# Patient Record
Sex: FEMALE | Race: OTHER | Employment: UNEMPLOYED | URBAN - METROPOLITAN AREA
[De-identification: names, ages, dates, MRNs, and addresses within clinical notes are randomized per-mention and may not be internally consistent; named-entity substitution may affect disease eponyms.]

---

## 2018-11-17 ENCOUNTER — HOSPITAL ENCOUNTER (EMERGENCY)
Age: 28
Discharge: HOME | End: 2018-11-17
Attending: EMERGENCY MEDICINE
Payer: SELF-PAY

## 2018-11-17 VITALS
SYSTOLIC BLOOD PRESSURE: 119 MMHG | TEMPERATURE: 98.2 F | RESPIRATION RATE: 18 BRPM | HEART RATE: 95 BPM | DIASTOLIC BLOOD PRESSURE: 82 MMHG | OXYGEN SATURATION: 100 % | WEIGHT: 145 LBS

## 2018-11-17 DIAGNOSIS — J06.9 VIRAL URI WITH COUGH: Primary | ICD-10-CM

## 2018-11-17 DIAGNOSIS — S46.812A STRAIN OF LEFT TRAPEZIUS MUSCLE, INITIAL ENCOUNTER: ICD-10-CM

## 2018-11-17 PROCEDURE — 74011250637 HC RX REV CODE- 250/637: Performed by: EMERGENCY MEDICINE

## 2018-11-17 PROCEDURE — 99282 EMERGENCY DEPT VISIT SF MDM: CPT

## 2018-11-17 RX ORDER — NAPROXEN 250 MG/1
500 TABLET ORAL
Status: COMPLETED | OUTPATIENT
Start: 2018-11-17 | End: 2018-11-17

## 2018-11-17 RX ORDER — METHOCARBAMOL 500 MG/1
500 TABLET, FILM COATED ORAL
Qty: 20 TAB | Refills: 0 | Status: SHIPPED | OUTPATIENT
Start: 2018-11-17 | End: 2019-06-13

## 2018-11-17 RX ORDER — NAPROXEN 500 MG/1
500 TABLET ORAL 2 TIMES DAILY WITH MEALS
Qty: 20 TAB | Refills: 0 | Status: SHIPPED | OUTPATIENT
Start: 2018-11-17 | End: 2019-06-13

## 2018-11-17 RX ORDER — METHOCARBAMOL 500 MG/1
1000 TABLET, FILM COATED ORAL ONCE
Status: COMPLETED | OUTPATIENT
Start: 2018-11-17 | End: 2018-11-17

## 2018-11-17 RX ADMIN — NAPROXEN 500 MG: 250 TABLET ORAL at 12:03

## 2018-11-17 RX ADMIN — METHOCARBAMOL TABLETS 1000 MG: 500 TABLET, COATED ORAL at 12:03

## 2018-11-17 NOTE — ED PROVIDER NOTES
The history is limited by a language barrier. A  was used. 33 y.o. female presents with 4 days cough, congestion, 2 days and has developed tightness and pain in her left trapezius pain radiates down to her tricep area. No trauma to the area. No fever, chills, nausea, vomiting, SOB, but also has noted some sharp chest pins with coughing, mildly. Predominantly in the left trapezius muscle, however. LMP 2 week prior. No past medical history on file. No past surgical history on file. No family history on file. Social History Socioeconomic History  Marital status: SINGLE Spouse name: Not on file  Number of children: Not on file  Years of education: Not on file  Highest education level: Not on file Social Needs  Financial resource strain: Not on file  Food insecurity - worry: Not on file  Food insecurity - inability: Not on file  Transportation needs - medical: Not on file  Transportation needs - non-medical: Not on file Occupational History  Not on file Tobacco Use  Smoking status: Not on file Substance and Sexual Activity  Alcohol use: Not on file  Drug use: Not on file  Sexual activity: Not on file Other Topics Concern  Not on file Social History Narrative  Not on file ALLERGIES: Patient has no known allergies. Review of Systems Constitutional: Positive for activity change. Negative for appetite change, chills and fever. HENT: Positive for congestion, rhinorrhea, sinus pressure and sneezing. Negative for sore throat. Eyes: Negative for photophobia and visual disturbance. Respiratory: Negative for cough and shortness of breath. Cardiovascular: Negative for chest pain. Gastrointestinal: Negative for abdominal pain, blood in stool, constipation, diarrhea, nausea and vomiting.   
Genitourinary: Negative for difficulty urinating, dysuria, flank pain, frequency, hematuria, menstrual problem, urgency, vaginal bleeding and vaginal discharge. Musculoskeletal: Positive for back pain (left trapezius). Negative for arthralgias, gait problem, joint swelling, myalgias and neck pain. Skin: Negative for rash and wound. Neurological: Negative for syncope, weakness, numbness and headaches. Psychiatric/Behavioral: Negative for self-injury and suicidal ideas. All other systems reviewed and are negative. Vitals:  
 11/17/18 1122 BP: 119/82 Pulse: 95 Resp: 18 Temp: 98.2 °F (36.8 °C) SpO2: 100% Weight: 65.8 kg (145 lb) Physical Exam  
Constitutional: She is oriented to person, place, and time. She appears well-developed and well-nourished. No distress. HENT:  
Head: Normocephalic and atraumatic. Right Ear: External ear normal.  
Left Ear: External ear normal.  
Erythematous posterior oropharynx without exudate or asymmetric swelling. rhinorrhea Eyes: Conjunctivae and EOM are normal. Pupils are equal, round, and reactive to light. Neck: Normal range of motion. Neck supple. Cardiovascular: Normal rate, regular rhythm, normal heart sounds and intact distal pulses. Pulmonary/Chest: Effort normal and breath sounds normal. She exhibits tenderness (mild in left anterior chest). Abdominal: Soft. She exhibits no distension. There is no tenderness. Musculoskeletal: She exhibits tenderness. She exhibits no edema or deformity. Left shoulder: She exhibits tenderness and spasm. She exhibits normal range of motion, no bony tenderness, no swelling, no crepitus, no deformity and normal pulse. Lymphadenopathy:  
  She has cervical adenopathy. Neurological: She is alert and oriented to person, place, and time. No cranial nerve deficit or sensory deficit. Coordination normal.  
Skin: Skin is warm and dry. No rash noted. She is not diaphoretic. Nursing note and vitals reviewed.  
  
 
PRISCILLA 
 33 y.o. female presents with URI sx as well as evidence of muscle spasm and strain in left trapezius. Nonproductive cough, afebrile with lungs CTAb. Low suspicion for PNA. Likely muscle strain from coughing. Given NSAIDS and robaxin in the ED and rx'd the same. rec'd hydration and rest with PCP f/u as needed. Return precautions were given for worsening or concerns Procedures

## 2018-11-17 NOTE — ED TRIAGE NOTES
Pt states she started having shocking pain in her left hand radiating up her arm, pt states she has had a cold, cough and congestion for the past 2 days.

## 2019-01-14 ENCOUNTER — HOSPITAL ENCOUNTER (EMERGENCY)
Age: 29
Discharge: HOME | End: 2019-01-14
Attending: EMERGENCY MEDICINE | Admitting: EMERGENCY MEDICINE
Payer: SELF-PAY

## 2019-01-14 VITALS
WEIGHT: 145 LBS | OXYGEN SATURATION: 100 % | HEART RATE: 94 BPM | HEIGHT: 65 IN | BODY MASS INDEX: 24.16 KG/M2 | DIASTOLIC BLOOD PRESSURE: 90 MMHG | SYSTOLIC BLOOD PRESSURE: 133 MMHG | RESPIRATION RATE: 18 BRPM | TEMPERATURE: 98.8 F

## 2019-01-14 DIAGNOSIS — N93.8 DUB (DYSFUNCTIONAL UTERINE BLEEDING): Primary | ICD-10-CM

## 2019-01-14 LAB
APPEARANCE UR: CLEAR
BACTERIA URNS QL MICRO: NEGATIVE /HPF
BILIRUB UR QL: NEGATIVE
CLUE CELLS VAG QL WET PREP: NORMAL
COLOR UR: ABNORMAL
EPITH CASTS URNS QL MICRO: ABNORMAL /LPF
GLUCOSE UR STRIP.AUTO-MCNC: NEGATIVE MG/DL
HCG UR QL: NEGATIVE
HGB UR QL STRIP: ABNORMAL
HYALINE CASTS URNS QL MICRO: ABNORMAL /LPF (ref 0–5)
KETONES UR QL STRIP.AUTO: NEGATIVE MG/DL
LEUKOCYTE ESTERASE UR QL STRIP.AUTO: NEGATIVE
NITRITE UR QL STRIP.AUTO: NEGATIVE
PH UR STRIP: 7.5 [PH] (ref 5–8)
PROT UR STRIP-MCNC: NEGATIVE MG/DL
RBC #/AREA URNS HPF: ABNORMAL /HPF (ref 0–5)
SP GR UR REFRACTOMETRY: 1.01 (ref 1–1.03)
T VAGINALIS VAG QL WET PREP: NORMAL
UR CULT HOLD, URHOLD: NORMAL
UROBILINOGEN UR QL STRIP.AUTO: 0.2 EU/DL (ref 0.2–1)
WBC URNS QL MICRO: ABNORMAL /HPF (ref 0–4)

## 2019-01-14 PROCEDURE — 81025 URINE PREGNANCY TEST: CPT

## 2019-01-14 PROCEDURE — 87210 SMEAR WET MOUNT SALINE/INK: CPT

## 2019-01-14 PROCEDURE — 87491 CHLMYD TRACH DNA AMP PROBE: CPT

## 2019-01-14 PROCEDURE — 81001 URINALYSIS AUTO W/SCOPE: CPT

## 2019-01-14 PROCEDURE — 99284 EMERGENCY DEPT VISIT MOD MDM: CPT

## 2019-01-14 NOTE — ED PROVIDER NOTES
33 y.o. female with no significant past medical history who presents from home with chief complaint of pelvic pain. Patient states that she began experiencing abdominal cramping yesterday evening. She notes that her abdominal pain resolved shortly after. This morning, however, patient complains that her abdominal cramping returned. She states that her discomfort is most severe in her RLQ and LLQ. She adds that her pain radiates downwards into her pelvis as well. Patient also complains of associated vaginal bleeding and increased urinary frequency that began this morning. She states that the bleeding was \"constant but not a lot\" and describes it as a \"dark red/black. \"  She comments that the vaginal bleeding today resembled previous menstrual cycles and denies observing clotting. She expresses concern, however, as she had her last menstrual cycle on \"12/27/18. \"  She acknowledges use of birth control pills but admits to a chance of pregnancy. Patient has previously visited the ED on 11/17/18 for viral URI with cough. Patient denies nausea, vomiting, diarrhea, chest pain, headache, lightheadedness, and dizziness. There are no other acute medical concerns at this time. Social hx: unknown tobacco, alcohol, and drug use PCP: None Note written by Dulce Fletcher, as dictated by Blake Johns MD 10:50 AM 
 
 
 
The history is provided by the patient and a relative. A  was used (relative). No past medical history on file. No past surgical history on file. No family history on file. Social History Socioeconomic History  Marital status: SINGLE Spouse name: Not on file  Number of children: Not on file  Years of education: Not on file  Highest education level: Not on file Social Needs  Financial resource strain: Not on file  Food insecurity - worry: Not on file  Food insecurity - inability: Not on file  Transportation needs - medical: Not on file  Transportation needs - non-medical: Not on file Occupational History  Not on file Tobacco Use  Smoking status: Not on file Substance and Sexual Activity  Alcohol use: Not on file  Drug use: Not on file  Sexual activity: Not on file Other Topics Concern  Not on file Social History Narrative  Not on file ALLERGIES: Patient has no known allergies. Review of Systems Constitutional: Negative for chills and fever. Respiratory: Negative for chest tightness. Cardiovascular: Negative for chest pain. Gastrointestinal: Positive for abdominal pain. Negative for constipation, diarrhea, nausea and vomiting. Genitourinary: Positive for frequency and vaginal bleeding. Neurological: Negative for dizziness, weakness, light-headedness and headaches. All other systems reviewed and are negative. Vitals:  
 01/14/19 1052 BP: 133/90 Pulse: 94 Resp: 18 Temp: 98.8 °F (37.1 °C) SpO2: 100% Weight: 65.8 kg (145 lb) Height: 5' 5\" (1.651 m) Physical Exam  
Constitutional: She is oriented to person, place, and time. She appears well-developed. No distress. HENT:  
Mouth/Throat: Oropharynx is clear and moist.  
Eyes: No scleral icterus. Cardiovascular: Normal rate and regular rhythm. Pulmonary/Chest: Effort normal and breath sounds normal.  
Abdominal: Soft. Normal appearance and bowel sounds are normal. There is no tenderness (no suprapubic tenderness). There is no CVA tenderness. No hernia. Genitourinary: Cervix exhibits no motion tenderness and no friability. There is bleeding in the vagina. No tenderness in the vagina. No foreign body in the vagina. No vaginal discharge found. Genitourinary Comments: Small amount of blood in vaginal canal; No clots; Cervix closed and non-tender; No ovarian tenderness or enlargement Neurological: She is alert and oriented to person, place, and time. Skin: Skin is warm and dry. Capillary refill takes less than 2 seconds. No rash noted. No pallor. Psychiatric: She has a normal mood and affect. Vitals reviewed. Note written by Cecillia Phalen, Scribe, as dictated by Janessa Puckett MD 10:50 AM 
 
MDM Number of Diagnoses or Management Options DUB (dysfunctional uterine bleeding):  
 
  
 
Procedures Pt has no acute sx from bleeding thus no blood work ordered. She has no pregnancy. No tenderness. HD stable. She is advised to follow up with gynecology and pcp. She is advised to return to emergency department If symptoms worsen, she develops new sx like dizzy, short of breath, worsening pain or bleeding or any new medical concern.  
 
Shanell Bryant MD

## 2019-01-14 NOTE — DISCHARGE INSTRUCTIONS
Patient Education        Renata Bounds uterino anormal: Instrucciones de cuidado - [ Abnormal Uterine Bleeding: Care Instructions ]  Instrucciones de cuidado    El sangrado uterino anormal (AUB, por kolby siglas en inglés) es un sangrado irregular del útero que dura más o es más intenso de lo normal o que no ocurre en el momento habitual para usted. A veces, se debe a cambios en los niveles hormonales. También puede estar causado por crecimientos en el útero, tales christel fibromas o pólipos. A veces no se puede encontrar la causa. Podría tener mucho sangrado cuando no está esperando busch período. Busch médico puede sugerirle raimundo prueba de embarazo si jyoti que está [place]ever[place]. La atención de seguimiento es raimundo parte clave de busch tratamiento y seguridad. Asegúrese de hacer y acudir a todas las citas, y llame a busch médico si está teniendo problemas. También es raimundo buena idea saber los resultados de kolby exámenes y mantener raimundo lista de los medicamentos que milan. ¿Cómo puede cuidarse en el hogar? · Sea jennifer con los medicamentos. Patillas los analgésicos (medicamentos para el dolor) exactamente christel le fueron indicados. ? Si el médico le recetó un analgésico, tómelo según las indicaciones. ? Si no está tomando un analgésico recetado, pregúntele a busch médico si puede kacy aneesh de [employer]. · Podría faltarle daniela por la pérdida de jerrod. Coma raimundo dieta equilibrada con alto contenido de daniela y vitamina C. Entre los alimentos ricos en daniela se encuentran las [place], los [place], los [place], los frijoles (habichuelas) y las verduras de hojas verdes. Pregúntele a busch médico si necesita kacy pastillas de daniela o un multivitamínico.  ¿Cuándo debe pedir ayuda? Llame al 911 en cualquier momento que considere que necesita atención de [place].  Por ejemplo, llame si:    · Se desmayó (perdió el conocimiento).    Llame a busch médico ahora mismo o busque atención médica inmediata si:    · Tiene dolor repentino e intenso en el abdomen o la pelvis.     · Tiene sangrado vaginal intenso. Empapa kolby toallas sanitarias o tampones habituales cada hora yola 2 o más horas.     · Siente mareos o aturdimiento, o que está a punto de desmayarse.    Preste especial atención a los cambios en busch jory y asegúrese de comunicarse con busch médico si:    · Tiene un dolor nuevo en el abdomen o la pelvis.     · Tiene fiebre.     · El sangrado empeora o dura más de 1 semana.     · Piensa que podría estar embarazada. ¿Dónde puede encontrar más información en inglés? Barbara Holden a http://bill-abhishek.info/. Giselle Puckett O271 en la búsqueda para aprender más acerca de \"Sangrado uterino anormal: Instrucciones de cuidado - [ Abnormal Uterine Bleeding: Care Instructions ]. \"  Revisado: [address] [place], 2018  Versión del contenido: 11.8  © 8130-6222 [employer]. Las instrucciones de cuidado fueron adaptadas bajo licencia por [employer] (which disclaims liability or warranty for this information). Si usted tiene [place] afección médica o sobre estas instrucciones, siempre pregunte a busch profesional de jory. Healthwise, Incorporated niega toda garantía o responsabilidad por busch uso de esta información.

## 2019-01-14 NOTE — ED TRIAGE NOTES
Pt's lmp Dec 27. Woke up this AM with cramping and vaginal bleeding similar to period. Is unsure if she's pregnant

## 2019-01-16 LAB
C TRACH DNA SPEC QL NAA+PROBE: NEGATIVE
N GONORRHOEA DNA SPEC QL NAA+PROBE: NEGATIVE
SAMPLE TYPE: NORMAL
SERVICE CMNT-IMP: NORMAL
SPECIMEN SOURCE: NORMAL

## 2019-04-11 ENCOUNTER — HOSPITAL ENCOUNTER (OUTPATIENT)
Dept: LAB | Age: 29
Discharge: HOME | End: 2019-04-11
Payer: SELF-PAY

## 2019-04-11 ENCOUNTER — OFFICE VISIT (OUTPATIENT)
Dept: FAMILY MEDICINE CLINIC | Age: 29
End: 2019-04-11

## 2019-04-11 ENCOUNTER — INITIAL PRENATAL (OUTPATIENT)
Dept: FAMILY MEDICINE CLINIC | Age: 29
End: 2019-04-11

## 2019-04-11 VITALS
SYSTOLIC BLOOD PRESSURE: 96 MMHG | OXYGEN SATURATION: 99 % | HEART RATE: 82 BPM | BODY MASS INDEX: 25.95 KG/M2 | HEIGHT: 62 IN | TEMPERATURE: 97.4 F | DIASTOLIC BLOOD PRESSURE: 63 MMHG | RESPIRATION RATE: 16 BRPM | WEIGHT: 141 LBS

## 2019-04-11 DIAGNOSIS — Z34.81 ENCOUNTER FOR SUPERVISION OF OTHER NORMAL PREGNANCY IN FIRST TRIMESTER: Primary | ICD-10-CM

## 2019-04-11 DIAGNOSIS — Z34.82 ENCOUNTER FOR SUPERVISION OF OTHER NORMAL PREGNANCY IN SECOND TRIMESTER: Primary | ICD-10-CM

## 2019-04-11 LAB
ANTIBODY SCREEN, EXTERNAL: NEGATIVE
BILIRUB UR QL STRIP: NEGATIVE
GLUCOSE UR-MCNC: NEGATIVE MG/DL
HBSAG, EXTERNAL: NEGATIVE
HCG URINE, QL. (POC): POSITIVE
HCT, EXTERNAL: 35.5
HGB, EXTERNAL: 10.6
HIV, EXTERNAL: NORMAL
KETONES P FAST UR STRIP-MCNC: NEGATIVE MG/DL
PH UR STRIP: 8.5 [PH] (ref 4.6–8)
PLATELET CNT,   EXTERNAL: 354
PROT UR QL STRIP: NEGATIVE
RPR, EXTERNAL: NORMAL
RUBELLA, EXTERNAL: NORMAL
SP GR UR STRIP: 1.01 (ref 1–1.03)
TYPE, ABO & RH, EXTERNAL: NORMAL
UA UROBILINOGEN AMB POC: ABNORMAL (ref 0.2–1)
URINALYSIS CLARITY POC: CLEAR
URINALYSIS COLOR POC: YELLOW
URINE BLOOD POC: ABNORMAL
URINE LEUKOCYTES POC: ABNORMAL
URINE NITRITES POC: NEGATIVE
VALID INTERNAL CONTROL?: YES
VARICELLA, EXTERNAL: NORMAL

## 2019-04-11 PROCEDURE — 87624 HPV HI-RISK TYP POOLED RSLT: CPT

## 2019-04-11 PROCEDURE — 88175 CYTOPATH C/V AUTO FLUID REDO: CPT

## 2019-04-11 NOTE — PROGRESS NOTES
Subjective:  
Dandy Taylor is a 26 y.o.  at [address] by LMP who is being seen today for her first obstetrical visit. This not a planned pregnancy. Currently desires pregnancy No history of abnormal pap See flow sheet for OB history. History of GDM or DM? None History of GHTN or HTN? None History of pre-eclampsia? None Taking prenatal vitamins? yes Allergies- reviewed:  
No Known Allergies Medications- reviewed:  
Current Outpatient Medications Medication Sig  PNV No12-Iron-FA-DSS-OM-3 29 mg iron-1 mg -50 mg CPKD Take  by mouth. No current facility-administered medications for this visit. Past Medical History- reviewed: 
History reviewed. No pertinent past medical history. Past Surgical History- reviewed:  
Past Surgical History:  
Procedure Laterality Date  HX BREAST LUMPECTOMY Right  Social History- reviewed: 
Social History Socioeconomic History  Marital status:  Spouse name: Not on file  Number of children: Not on file  Years of education: Not on file  Highest education level: Not on file Occupational History  Not on file Social Needs  Financial resource strain: Not on file  Food insecurity:  
  Worry: Not on file Inability: Not on file  Transportation needs:  
  Medical: Not on file Non-medical: Not on file Tobacco Use  Smoking status: Never Smoker  Smokeless tobacco: Never Used Substance and Sexual Activity  Alcohol use: Never Frequency: Never  Drug use: Never  Sexual activity: Yes  
  Partners: Male Lifestyle  Physical activity:  
  Days per week: Not on file Minutes per session: Not on file  Stress: Not on file Relationships  Social connections:  
  Talks on phone: Not on file Gets together: Not on file Attends [employer] service: Not on file Active member of club or organization: Not on file Attends meetings of clubs or organizations: Not on file Relationship status: Not on file  Intimate partner violence:  
  Fear of current or ex partner: Not on file Emotionally abused: Not on file Physically abused: Not on file Forced sexual activity: Not on file Other Topics Concern  Not on file Social History Narrative  Not on file Objective:  
 
Visit Vitals BP 96/63 Pulse 82 Temp 97.4 °F (36.3 °C) (Oral) Resp 16 Ht 5' 1.5\" (1.562 m) Wt 141 lb (64 kg) LMP 01/14/2019 (Exact Date) SpO2 99% BMI 26.21 kg/m² See physical exam on flowsheet Pelvic exam chaperoned by Shalom Ash LPN Labs: 
Recent Results (from the past 12 hour(s)) AMB POC URINALYSIS DIP STICK AUTO W/O MICRO Collection Time: 04/11/19  1:13 PM  
Result Value Ref Range Color (UA POC) Yellow Clarity (UA POC) Clear Glucose (UA POC) Negative Negative Bilirubin (UA POC) Negative Negative Ketones (UA POC) Negative Negative Specific gravity (UA POC) 1.015 1.001 - 1.035 Blood (UA POC) Trace Negative pH (UA POC) 8.5 (A) 4.6 - 8.0 Protein (UA POC) Negative Negative Urobilinogen (UA POC) 0.2 mg/dL 0.2 - 1 Nitrites (UA POC) Negative Negative Leukocyte esterase (UA POC) Trace Negative AMB POC URINE PREGNANCY TEST, VISUAL COLOR COMPARISON Collection Time: 04/11/19  1:13 PM  
Result Value Ref Range VALID INTERNAL CONTROL POC Yes HCG urine, Ql. (POC) Positive Negative Assessment and Plan: ICD-10-CM ICD-9-CM 1. Encounter for supervision of other normal pregnancy in first trimester Z34.81 V22.1 26 y.o. [id number] at [address] by LMP 1/14/19 here for initial OB visit · IOB labs collected · Discussed recommended weight gain · Encouraged to continue prenatal vitamins · Discuss nipple cups for inversion in 3rd trimester ~28weeks · Discussed optional genetic screening  - is interested.  Need to confirm dating prior to ordering. Discussed cost for [employer] $602 · Request for MFM anatomy scan following dating US · Dating ultrasound scheduled: 4/18/19 at 3:30pm with Dr Sharon Fernandez · Follow up in 4 weeks with Dr Gregory Grey Orders Placed This Encounter  PNV No12-Iron-FA-DSS-OM-3 29 mg iron-1 mg -50 mg CPKD Sig: Take  by mouth. I have discussed the diagnosis with the patient and the intended plan as seen in the above orders. The patient has received an after-visit summary and questions were answered concerning future plans. I have discussed medication side effects and warnings with the patient as well. Informed pt to return to the office or go to the ER if she experiences vaginal bleeding, vaginal discharge, leaking of fluid, pelvic cramping. Patient discussed with Dr Oly Santos (attending physician) Urvashi Downs MD 
Family Medicine Resident

## 2019-04-11 NOTE — PATIENT INSTRUCTIONS
Chay Kolb a busch médico yola el embarazo ([address] (TTR) 20 semanas) - [ Learning About When to Call Your Doctor During Pregnancy (Up to 20 Weeks) ] Instrucciones de cuidado Es normal que tenga inquietudes acerca de lo que podría ser un problema yola el [facility]. Aunque la mayoría de las mujeres embarazadas no tienen ningún problema grave, es importante saber cuándo llamar a busch médico si tiene determinados síntomas. Estas son algunas sugerencias generales. Busch médico puede darle más información sobre cuándo llamar. Cuándo llamar a busch médico ([address] (TTR) 20 semanas) Llame al 911 en cualquier momento que sospeche que puede necesitar atención de [place]. Por ejemplo, llame si: 
· Se desmayó (perdió el conocimiento). Llame a busch médico ahora mismo o busque atención médica inmediata si: · Tiene fiebre. · Tiene sangrado vaginal. 
· Está mareada o aturdida, o siente que puede desmayarse. · Tiene síntomas de raimundo infección del tracto urinario. Estos pueden incluir: ? Dolor o ardor al orinar. ? Necesidad de orinar con frecuencia sin poder eliminar mucha orina. ? Dolor en el flanco, que se encuentra zenaida debajo de la caja torácica y [place] de la cintura en un lado de la espalda. ? [employer]. · Tiene dolor abdominal. 
· Suellen que está teniendo contracciones. · Tiene raimundo pérdida repentina de líquido por la vagina. Preste especial atención a los cambios en busch jory y asegúrese de comunicarse con busch médico si: · Tiene flujo vaginal con un olor desagradable. · Tiene otras inquietudes acerca de busch embarazo. La atención de seguimiento es raimundo parte clave de busch tratamiento y seguridad. Asegúrese de hacer y acudir a todas las citas, y llame a busch médico si está teniendo problemas. También es raimundo buena idea saber los resultados de kolby exámenes y mantener raimundo lista de los medicamentos que milan. Dónde puede encontrar más información en inglés? Jovi Austin a http://bill-abhishek.info/. Amie Cee K023 en la búsqueda para aprender más acerca de \"Aprenda cuándo llamar a busch médico yola el embarazo ([address] 20 semanas) - [ Learning About When to Call Your Doctor During Pregnancy (Up to 20 Weeks) ]. \" 
Revisado: 5 septiembre, 2018 Versión del contenido: 11.9 © 2148-5121 Healthwise, Incorporated. Las instrucciones de cuidado fueron adaptadas bajo licencia por [employer] (which disclaims liability or warranty for this information). Si usted tiene [place] afección médica o sobre estas instrucciones, siempre pregunte a busch profesional de jory. Healthwise, Incorporated niega toda garantía o responsabilidad por busch uso de esta información. Semanas 10 a 14 de busch embarazo: Instrucciones de cuidado - [ Lo  10 to 14 of Your Pregnancy: Care Instructions ] Instrucciones de cuidado Para las semanas 10 a 14 de busch embarazo, la placenta se ha formado dentro del útero. Es posible oír los latidos del corazón de busch bebé con un instrumento especial de ultrasonido. Los ojos de busch bebé pueden moverse y lo [place]. Los brazos y las piernas se pueden flexionar. [place] es un buen momento para pensar en las pruebas para detectar defectos congénitos. Existen dos tipos de pruebas: de detección y de diagnóstico. Las pruebas de detección muestran la posibilidad de que un bebé tenga un determinado defecto congénito. No pueden decirle con seguridad que busch bebé tiene un problema. Las pruebas de diagnóstico muestran si un bebé tiene un determinado defecto congénito. Es busch decisión si desea hacerse estas pruebas. Usted y busch devaughn pueden hablar con busch médico o partera sobre las pruebas de defectos congénitos. La atención de seguimiento es raimundo parte clave de busch tratamiento y seguridad. Asegúrese de hacer y acudir a todas las citas, y llame a busch médico si está teniendo problemas.  También es raimundo buena idea [employer] resultados de kolby exámenes y mantener raimundo lista de los medicamentos que milan. Cómo puede cuidarse en el hogar? Estevan Mace · Puede hacerse pruebas de detección y pruebas de diagnóstico para detectar defectos congénitos. La decisión de [employer] [place] prueba para detectar defectos congénitos es personal. Considere busch edad, busch probabilidad de transmitir raimundo enfermedad hereditaria, busch necesidad de saber acerca de cualquier problema y lo que podría hacer después de tener los [place] de la prueba. ? Análisis de jerrod triple o cuádruple. Estas pruebas de detección se pueden hacer entre las semanas 15 y 21 del [facility]. [address] cantidades de toñito o cuatro sustancias en la jerrod. El médico observa estos resultados de la prueba, junto con busch edad y otros factores, para averiguar la probabilidad de que busch bebé pueda tener ciertos problemas. ? Amniocentesis. Esta prueba de diagnóstico se utiliza para detectar problemas cromosómicos en las células del bebé. Se puede hacer [employer] 15 y 21 del [facility], por lo general alrededor de la semana 16. 
? Prueba de translucencia nucal. Esta prueba [place] ultrasonido para medir el grosor de la alyse de la nuca del bebé. Un aumento en el grosor puede ser raimundo señal temprana del síndrome de Down. 
? Muestra de vellosidades coriónicas ([facility], por kolby siglas en inglés). Esta es raimundo prueba que detecta determinados problemas genéticos del bebé. Los mismos genes que tiene busch bebé están en la placenta. Se extrae y se examina un pequeño pedazo de placenta. Esta prueba se realiza cuando usted [employer] semana 10 y 15 de busch embarazo. Alivie la falta de comodidad · Cálmese y duerma siestas cuando se sienta cansada. · Si kolby emociones varían, hable con alguien. El llanto, la ansiedad y los problemas de concentración son comunes. · Si le sangran las encías, pruebe usar un cepillo de dientes más [place].  Si tiene las NEGIN [employer] o estas le sangran mucho, consulte con busch dentista. · Si tiene mareos: 
? Levántese despacio después de estar sentada o acostada. ? Sharda abundantes líquidos. ? Coma pequeños refrigerios para mantener estable el azúcar en jerrod. ? Coloque la Arnold Danis ewa piernas christel si estuviera atándose los cordones (agujetas). ? Acuéstese con las piernas más arriba que busch messi. Use almohadas para apoyar los pies. · Si tiene dolor de messi: ? Acuéstese. ? [place]ídale a busch devaughn o a un amigo que le prakash un masaje en el nataliia. ? Colóquese paños fríos sobre la frente o en la nuca. ? Use acetaminofén (Tylenol) para aliviar el dolor. No tome antiinflamatorios no esteroideos (REUBEN), tales christel ibuprofeno (Advil, Motrin) o naproxeno (Aleve), a menos que busch médico le diga que puede [place]. · Si le sangra la Emmalene Manges, apriétesela con suavidad y manténgala apretada por un rato. Para evitar sangrados nasales, pruebe hacerse masajes en las fosas nasales con raimundo cantidad pequeña de vaselina. · Si tiene la nariz congestionada, pruebe con un aerosol nasal salino (agua salada). No utilice aerosoles descongestionantes. Cuídese los senos · Use un sostén que le dé buen soporte. · Sepa que los cambios en los senos son normales. ? Ewa senos pueden aumentar de adam [employer]. El aumento de la sensibilidad suele mejorar a las 12 semanas. ? Ewa pezones pueden oscurecerse y agrandarse, y es posible que las pequeñas protuberancias (abultamientos) alrededor de ellos huseyin más visibles. ? Las venas del pecho y de los senos podrían ser Gennett Kevin. · No se preocupe por endurecer los pezones. El amamantamiento hará esto naturalmente. Dónde puede encontrar más información en inglés? Rockwall Monserrat a http://bill-abhishek.info/. Nahid Courts I637 en la búsqueda para aprender más acerca de \"Semanas 10 a 14 de busch embarazo: Instrucciones de cuidado - [ Mehul Espitia 10 to 14 of Your Pregnancy: Care Instructions ]. \" 
Revisado: 5 septiembre, 2018 Versión del contenido: 11.9 © 2340-3172 Healthwise, Incorporated. Las instrucciones de cuidado fueron adaptadas bajo licencia por [employer] (which disclaims liability or warranty for this information). Si usted tiene [place] afección médica o sobre estas instrucciones, siempre pregunte a busch profesional de jory. Healthwise, Incorporated niega toda garantía o responsabilidad por busch uso de esta información. Aprenda acerca de las pruebas para detectar anomalías congénitas - [ Learning About Birth Defects Testing ] Panorama Test $437 Kathlean Mola son las pruebas para detectar anomalías congénitas? Las pruebas para detectar anomalías congénitas (de nacimiento) se realizan yola el embarazo para angel si el bebé (feto) tiene posibles problemas. Raimundo anomalía congénita puede tener solo un mínimo impacto en la hugo de un lupe. O puede tener efectos graves en la calidad o duración de busch hugo. Usted y busch médico pueden elegir entre [employer]. Puede elegir no hacerse ninguna prueba, hacerse raimundo prueba o [employer]. Hablar con un asesor genético puede ayudarla a kacy decisiones acerca de las pruebas. El asesor está capacitado para ayudarla a comprender estas pruebas. También puede ayudarla a encontrar recursos de apoyo. Cuáles son los tipos de pruebas? Podría hacerse raimundo prueba de detección o raimundo prueba de diagnóstico. O podría hacerse ambos tipos de [place]. Las pruebas de detección muestran la probabilidad de que un bebé tenga raimundo determinada anomalía congénita, christel síndrome de Down, misty bífida o trisomía 18. Las pruebas de diagnóstico indican si un bebé tiene raimundo determinada anomalía congénita. · Pruebas de detección y cuándo se realizan: 
? Análisis de [employer] la semana 10 y 15 (primer trimestre) ? Análisis de ADN fetal rubén a las 10 semanas o más tarde (primer trimestre) ? Prueba de translucencia nucal entre la semana 11 y 15 (primer trimestre) ? Prueba de detección triple o cuádruple entre la semana 15 y 21 ([de-identified] trimestre) ? Ecografía entre la semana 18 y 21 ([de-identified] trimestre) · Pruebas de diagnóstico y cuándo se realizan: ? Muestra de vellosidades coriónicas ([facility], por kolby siglas en inglés) entre la semana 10 y 15 (primer trimestre) ? Amniocentesis entre la semana 15 y 21 ([de-identified] trimestre) En algunos casos, los médicos mayuri en consideración las pruebas combinadas que usted se haya hecho a lo jessica de un período de [place]. A esto se le llama pruebas integradas. Cuáles son Elvan Severance detección? · Se realizan análisis de jerrod para examinar diferentes sustancias en la jerrod. Estas pruebas incluyen el análisis de ADN fetal rubén y los análisis de jerrod triples o cuádruples. Ambas pruebas pueden ayudar a detectar problemas genéticos. · La prueba de translucencia nucal utiliza raimundo ecografía para medir el grosor de la alyse de la nuca del bebé. Un aumento en el grosor puede ser raimundo señal temprana de ciertas anomalías congénitas. La ecografía es raimundo herramienta que utiliza ondas sonoras para crear imágenes del bebé y de la placenta dentro del Minetta Poplin. · La ecografía le permite a busch médico angel raimundo imagen de busch bebé. Puede ayudarle a busch médico a detectar problemas del corazón, de la columna vertebral, del abdomen o de otras zonas. Cuáles son M[place]? 
La muestra de vellosidades coriónicas (CVS, por kolby siglas en inglés) examina células de la placenta. Para realizar esta prueba, es posible que busch médico le inserte un tubo [place] por la vagina y el nataliia uterino para extraer un pequeño trozo de placenta. O el médico podría extraer el trozo a través de raimundo aguja en busch abdomen. Esta prueba puede diagnosticar [employer]. Vincen[place] no puede detectar problemas de la médula evans. 
La amniocentesis examina el líquido amniótico que rodea a busch bebé.  El médico le insertará raimundo aguja a través del abdomen hasta el [place] y tomará raimundo cantidad muy pequeña de líquido amniótico para examinarlo. Cuáles son los riesgos de estas pruebas? Hay un riesgo leve de aborto espontáneo después de la CVS o de la amniocentesis. Busch médico o asesor genético pueden ayudarla a comprender tanya riesgo. Por lo general, estas pruebas son muy seguras. Dónde puede encontrar más información en inglés? Ethan Horta a http://bill-abhishek.info/. Sheila Stewart G030 en la búsqueda para aprender más acerca de \"Aprenda acerca de las pruebas para detectar anomalías congénitas - [ Learning About Birth Defects Testing ]. \" 
Revisado: 5 septiembre, 2018 Versión del contenido: 11.9 © 4122-6111 Healthwise, Incorporated. Las instrucciones de cuidado fueron adaptadas bajo licencia por [employer] (which disclaims liability or warranty for this information). Si usted tiene [place] afección médica o sobre estas instrucciones, siempre pregunte a busch profesional de jory. Healthwise, Incorporated niega toda garantía o responsabilidad por busch uso de esta información.

## 2019-04-11 NOTE — PROGRESS NOTES
27 yo  at 12 weeks by LMP Denies pregnancy complications Surgical Hx:  Breast surgery  FHT = 165 BMI = 26 Denies pregnancy complications Interested in genetic screening -- resident discussed use of NIPT 
 
F/U in 4 weeks I reviewed with the resident the medical history and the resident's findings on the physical examination. I discussed with the resident the patient's diagnosis and concur with the plan.

## 2019-04-11 NOTE — PROGRESS NOTES
Chief Complaint Patient presents with  
 Initial Prenatal Visit . 12w 3d today. No bleeding or LOF.  +FM. 1. Have you been to the ER, urgent care clinic since your last visit? Hospitalized since your last visit? No 
 
2. Have you seen or consulted any other health care providers outside of the [address] [place] [address] since your last visit? Include any pap smears or colon screening.  No

## 2019-04-12 LAB — BACTERIA UR CULT: NORMAL

## 2019-04-18 ENCOUNTER — ROUTINE PRENATAL (OUTPATIENT)
Dept: FAMILY MEDICINE CLINIC | Age: 29
End: 2019-04-18

## 2019-04-18 VITALS
TEMPERATURE: 97.6 F | SYSTOLIC BLOOD PRESSURE: 95 MMHG | HEIGHT: 62 IN | HEART RATE: 73 BPM | DIASTOLIC BLOOD PRESSURE: 58 MMHG | RESPIRATION RATE: 16 BRPM | BODY MASS INDEX: 25.95 KG/M2 | WEIGHT: 141 LBS | OXYGEN SATURATION: 98 %

## 2019-04-18 DIAGNOSIS — Z34.91 FIRST TRIMESTER PREGNANCY: Primary | ICD-10-CM

## 2019-04-18 NOTE — PROGRESS NOTES
Chief Complaint   Patient presents with    Ultrasound     Leakage of Fluid: NO  Vaginal Bleeding: NO  Fetal Movement: YES  Prenatal vitamins: YES  Having Contractions: NO  Pain: NO    Visit Vitals  Ht 5' 1.5\" (1.562 m)   Wt 141 lb (64 kg)   LMP 01/14/2019 (Exact Date)   BMI 26.21 kg/m²

## 2019-04-18 NOTE — PROGRESS NOTES
OB Dating Ultrasound    Patient is a 26 y.o.  with an estimated gestational age of 13w3d by LMP who presents for dating ultrasound. Chart reviewed for the following:   Ti Lee MD, have reviewed the History, Physical and updated the Allergic reactions for [place] performed immediately prior to start of procedure:   Ti Lee MD, have performed the following reviews on  prior to the start of the procedure:            * Patient was identified by name and date of birth   * Agreement on procedure being performed was verified  * Risks and Benefits explained to the patient  * Procedure site verified and marked as necessary  * Patient was positioned for comfort  * Consent was signed and verified     Time: 3:27 PM  Date of procedure: 2019  Procedure performed by:  Jillian Gasca MD  How tolerated by patient: tolerated the procedure well with no complications    Ultrasound type:  Transabdominal  Findings: single IUP with +cardiac activity, fetus active. Placenta location: Anterior  Fluid: grossly normal     GA by LMP: 13w3d  GA by AUA: 13w1d    SABAS by ultrasound today IS consistent with SABAS by LMP.   KEEP SABAS to: 10/21/19    Patient seen and discussed with Dr. Ferris(Attending)    Jillian Gasca MD  Family Medicine Resident

## 2019-04-22 LAB
ABO GROUP BLD: NORMAL
BLD GP AB SCN SERPL QL: NEGATIVE
ERYTHROCYTE [DISTWIDTH] IN BLOOD BY AUTOMATED COUNT: 14.4 % (ref 12.3–15.4)
HBV SURFACE AG SERPL QL IA: NEGATIVE
HCT VFR BLD AUTO: 35.1 % (ref 34–46.6)
HGB A MFR BLD: 97.3 % (ref 96.4–98.8)
HGB A2 MFR BLD COLUMN CHROM: 2.7 % (ref 1.8–3.2)
HGB BLD-MCNC: 11.1 G/DL (ref 11.1–15.9)
HGB C MFR BLD: 0 %
HGB F MFR BLD: 0 % (ref 0–2)
HGB FRACT BLD-IMP: NORMAL
HGB OTHER MFR BLD HPLC: 0 %
HGB S BLD QL SOLY: NEGATIVE
HGB S MFR BLD: 0 %
HIV 1+2 AB+HIV1 P24 AG SERPL QL IA: NON REACTIVE
MCH RBC QN AUTO: 25.6 PG (ref 26.6–33)
MCHC RBC AUTO-ENTMCNC: 31.6 G/DL (ref 31.5–35.7)
MCV RBC AUTO: 81 FL (ref 79–97)
PLATELET # BLD AUTO: 303 X10E3/UL (ref 150–379)
RBC # BLD AUTO: 4.34 X10E6/UL (ref 3.77–5.28)
RH BLD: POSITIVE
RPR SER QL: NON REACTIVE
RUBV IGG SERPL IA-ACNC: 3.74 INDEX
VZV IGG SER IA-ACNC: 352 INDEX
WBC # BLD AUTO: 8.8 X10E3/UL (ref 3.4–10.8)

## 2019-04-22 NOTE — PROGRESS NOTES
IOL: CBC wnl, group A pos, Hgb fractionation with normal hemoglobin, HIV NR, RPR NR, HepB sAg Negative, Varicella immune, Rubella immune,

## 2019-04-22 NOTE — PROGRESS NOTES
I was present during the critical and key portions of the procedure and immediately available to furnish services.      LMP consistent with early US    Use LMP     EDC = 10/21/19

## 2019-05-16 NOTE — PROGRESS NOTES
Return OB Visit     Subjective:   Kandice Dallas is a 26 y.o.  at 17w3d by LMP who is here for routine OB visit. SABAS:10/21/2019     Due to language barrier, an  was present during the history-taking and subsequent discussion (and for part of the physical exam) with this patient. LOF: None  Vaginal bleeding: None  Fetal movement (after 20 weeks): Some fetal movement endorsed  Contractions: None  Dysuria?: None  Headaches, blurred vision, RUQ pain?: None  Taking prenatal vitamins: Yes    Allergies   No Known Allergies    Medications:   Current Outpatient Medications   Medication Sig    PNV No12-Iron-FA-DSS-OM-3 29 mg iron-1 mg -50 mg CPKD Take  by mouth.  naproxen (NAPROSYN) 500 mg tablet Take 1 Tab by mouth two (2) times daily (with meals).  methocarbamol (ROBAXIN) 500 mg tablet Take 1 Tab by mouth three (3) times daily as needed. No current facility-administered medications for this visit.       Past Medical History:  Past Medical History:   Diagnosis Date    Breast disorder      Past Surgical History:   Past Surgical History:   Procedure Laterality Date    HX BREAST LUMPECTOMY Right 2013     Social History:  Social History     Socioeconomic History    Marital status:      Spouse name: Not on file    Number of children: Not on file    Years of education: Not on file    Highest education level: Not on file   Occupational History    Not on file   Social Needs    Financial resource strain: Not on file    Food insecurity:     Worry: Not on file     Inability: Not on file    Transportation needs:     Medical: Not on file     Non-medical: Not on file   Tobacco Use    Smoking status: Never Smoker    Smokeless tobacco: Never Used   Substance and Sexual Activity    Alcohol use: Never     Frequency: Never    Drug use: Never    Sexual activity: Yes     Partners: Male   Lifestyle    Physical activity:     Days per week: Not on file     Minutes per session: Not on file  Stress: Not on file   Relationships    Social connections:     Talks on phone: Not on file     Gets together: Not on file     Attends Jew service: Not on file     Active member of club or organization: Not on file     Attends meetings of clubs or organizations: Not on file     Relationship status: Not on file    Intimate partner violence:     Fear of current or ex partner: Not on file     Emotionally abused: Not on file     Physically abused: Not on file     Forced sexual activity: Not on file   Other Topics Concern    Not on file   Social History Narrative    Not on file     Immunizations: There is no immunization history on file for this patient. Objective:   Visit Vitals  /65 (BP 1 Location: Right arm, BP Patient Position: Sitting)   Pulse 83   Temp 98.3 °F (36.8 °C) (Oral)   Resp 16   Ht 5' 1.5\" (1.562 m)   Wt 144 lb 9.6 oz (65.6 kg)   LMP 2019 (Exact Date)   SpO2 100%   BMI 26.88 kg/m²     Physical Exam  GENERAL APPEARANCE: alert, well appearing, in no apparent distress  ABDOMEN: gravid, fundal height 17 cm, FHT present at 154 bpm  PSYCH: normal mood and affect    Labs  Recent Results (from the past 12 hour(s))   AMB POC URINALYSIS DIP STICK AUTO W/O MICRO    Collection Time: 19 11:14 AM   Result Value Ref Range    Color (UA POC) Yellow     Clarity (UA POC) Clear     Glucose (UA POC) Negative Negative    Bilirubin (UA POC) Negative Negative    Ketones (UA POC) 3+ Negative    Specific gravity (UA POC) 1,020.000 (A) 1.001 - 1.035    Blood (UA POC) Trace Negative    pH (UA POC) 7.0 4.6 - 8.0    Protein (UA POC) Negative Negative    Urobilinogen (UA POC) 0.2 mg/dL 0.2 - 1    Nitrites (UA POC) Negative Negative    Leukocyte esterase (UA POC) 1+ Negative     Assessment   Jose Paris is a 26 y.o.  at 17w3d by LMP here for a return OB visit.   SABAS 10/21/2019  Plan   First trimester (up to 12wk)  · IOB labs: CBC wnl, group A pos, Hgb fractionation with normal hemoglobin, HIV NR, RPR NR, HepB sAg Negative, Varicella immune, Rubella immune, pap normal   · Genetic Screening: genetic screening discussed with Dr. Denman Buerger on initial OB visit. At this visit patient declined screening. · Dating ultrasound: performed on 04/18, SABAS consisted with LMP     Second trimester (12-28 wk)  · Ketonuria: +3 ketones noted on urinalysis likely secondary to patient reportedly fasting (abstaining from food or drink) until noon twice a week. Patient was unclear on reason for fasting. On one occasion she reported it was [employer] and when questioned again in discussion, she reported it as just a personal decision. Patient was counseled on importance of appropriate hydration and caloric intake during pregnancy to support fetal growth. Continue to monitor for ketones and fetal growth. · MFM anatomy scan requested: scheduled on June 6th 2019   · Follow up in 4 weeks: with Dr. Denman Buerger on June Other  · Continuity Provider:  Dr. Denman Buerger  · Pain mgmt. in labor:  tbd  · Feeding: tbd    Labor precautions discussed, including: Regular painful contractions, lasting for greater than one hour, taking your breath away; any vaginal bleeding; any leakage of fluid; or absent or decreased fetal movement. Call M.D. on call if any of these symptoms or signs occur. I have discussed the diagnosis with the patient and the intended plan as seen in the above orders. The patient has received an after-visit summary and questions were answered concerning future plans. I have discussed medication side effects and warnings with the patient as well. Informed pt to return to the office or go to the ER if she experiences vaginal bleeding, vaginal discharge, leaking of fluid, pelvic cramping.     Pt discussed with Dr. Emerald Flores (Attending Physician)    Jesica Huber MD  Family Medicine Resident

## 2019-05-17 ENCOUNTER — ROUTINE PRENATAL (OUTPATIENT)
Dept: FAMILY MEDICINE CLINIC | Age: 29
End: 2019-05-17

## 2019-05-17 VITALS
DIASTOLIC BLOOD PRESSURE: 65 MMHG | HEIGHT: 62 IN | RESPIRATION RATE: 16 BRPM | WEIGHT: 144.6 LBS | TEMPERATURE: 98.3 F | BODY MASS INDEX: 26.61 KG/M2 | OXYGEN SATURATION: 100 % | SYSTOLIC BLOOD PRESSURE: 118 MMHG | HEART RATE: 83 BPM

## 2019-05-17 DIAGNOSIS — Z3A.17 17 WEEKS GESTATION OF PREGNANCY: Primary | ICD-10-CM

## 2019-05-17 LAB
BILIRUB UR QL STRIP: NEGATIVE
GLUCOSE UR-MCNC: NEGATIVE MG/DL
KETONES P FAST UR STRIP-MCNC: NORMAL MG/DL
PH UR STRIP: 7 [PH] (ref 4.6–8)
PROT UR QL STRIP: NEGATIVE
SP GR UR STRIP: 1.02 (ref 1–1.03)
UA UROBILINOGEN AMB POC: NORMAL (ref 0.2–1)
URINALYSIS CLARITY POC: CLEAR
URINALYSIS COLOR POC: YELLOW
URINE BLOOD POC: NORMAL
URINE LEUKOCYTES POC: NORMAL
URINE NITRITES POC: NEGATIVE

## 2019-05-17 NOTE — PATIENT INSTRUCTIONS
Semanas 18 a 22 de busch embarazo: Instrucciones de cuidado - [ Madison Alhaji 18 to 22 of Your Pregnancy: Care Instructions ]  Instrucciones de cuidado    Busch bebé continúa desarrollándose rápidamente. En esta etapa, los bebés ya pueden chuparse el pulgar, agarrar firmemente con las [place], y abrir y cerrar los párpados. En algún Garcia's 18 y 25, comenzará a sentir que el bebé se [place]. Al principio, estos pequeños movimientos se sentirán christel un aleteo o un vuelo de mariposas. Algunas mujeres dicen que sienten christel burbujas de [place]. A medida que el bebé crece, estos movimientos serán más lino. También podría observar que busch bebé patea y tiene hipo. Yola atnya Sumit, podría descubrir que las náuseas y la fatiga desaparecieron. En general, es posible que se sienta mejor y tenga más energía que la que tenía yola el primer trimestre. Sin embargo, también podría tener nuevas [place], christel problemas para dormir o calambres en las piernas. Esta hoja de cuidados la ayudará a aliviar esas molestias. La atención de seguimiento es raimundo parte clave de busch tratamiento y seguridad. Asegúrese de hacer y acudir a todas las citas, y llame a busch médico si está teniendo problemas. También es raimundo buena idea saber los resultados de kolby exámenes y mantener raimundo lista de los medicamentos que milan. ¿Cómo puede cuidarse en el hogar? Alivie los problemas para dormir  · Evite la cafeína en las bebidas o los chocolates a última hora del día. · Roland algo de ejercicio todos los días. · Dúchese o báñese en agua tibia antes de irse a la cama. · Coma un refrigerio liviano o yelena un vaso de leche a la hora de dormir. · Roland ejercicios de relajación en la cama para tranquilizar busch mente y busch cuerpo. · Apoye kolby piernas y busch espalda con almohadas adicionales. Si duerme de costado, pruebe a ponerse raimundo [employer] kolby piernas. · No use píldoras para dormir ni consuma alcohol. Podrían hacerle daño a busch bebé.   Arnaldo & Sarah calambres en las piernas  · No masajee busch pantorrilla mientras tiene un calambre. · Siéntese en raimundo cama o silla firme. Estire la cate y Walk-in ([facility] el [facility]) despacio, Jermain levy, en dirección a la rodilla. Doble los dedos de los pies hacia arriba y [place]. · Póngase de pie sobre raimundo superficie plana y fresca. Estire los dedos de los pies hacia arriba y dé pequeños pasos con el talón. · Use raimundo almohadilla térmica o raimundo bolsa de [place] para aliviar padma musculares. Prevenga los calambres en las piernas  · Asegúrese de consumir suficiente calcio. Si está preocupada porque no está obteniendo lo suficiente, hable con busch médico.  · Roland ejercicio todos los kori y estire las piernas antes de irse a dormir. · Dese un baño tibio antes de irse a dormir y pruebe a usar calentadores de piernas. ¿Dónde puede encontrar más información en inglés? Maria Isabel Nichols a http://bill-abhishek.info/. Waqar Nolen T830 en la búsqueda para aprender más acerca de \"Semanas 18 a 22 de busch embarazo: Instrucciones de cuidado - [ Edilberto Brisker 18 to 22 of Your Pregnancy: Care Instructions ]. \"  Revisado: 5 septiembre, 2018  Versión del contenido: 11.9  © 1654-3504 Healthwise, Incorporated. Las instrucciones de cuidado fueron adaptadas bajo licencia por [employer] (which disclaims liability or warranty for this information). Si usted tiene [place] afección médica o sobre estas instrucciones, siempre pregunte a busch profesional de jory. Healthwise, Incorporated niega toda garantía o responsabilidad por busch uso de esta información.

## 2019-05-17 NOTE — PROGRESS NOTES
Chief Complaint   Patient presents with    Routine Prenatal Visit      17weeks 4 days No LOF or bleeding + FM     1. Have you been to the ER, urgent care clinic since your last visit? Hospitalized since your last visit? No     2. Have you seen or consulted any other health care providers outside of the [address] [place] [address] since your last visit? Include any pap smears or colon screening. No  Visit Vitals  /65 (BP 1 Location: Right arm, BP Patient Position: Sitting)   Pulse 83   Temp 98.3 °F (36.8 °C) (Oral)   Resp 16   Ht 5' 1.5\" (1.562 m)   Wt 144 lb 9.6 oz (65.6 kg)   SpO2 100%   BMI 26.88 kg/m²     Health Maintenance Due   Topic Date Due    DTaP/Tdap/Td series (1 - Tdap) 2010     Patient notified in clinic of [facility] appointment dated for 2019 at 1:45 PM. Patient given an appointment reminder in clinic during this visit. Patient agreed to appointment time and date.

## 2019-05-18 NOTE — PROGRESS NOTES
U/A at 17 weeks gestation shows +3 ketones likely secondary to patient fasting twice a week. The importance of staying hydrated and maintaining appropriate caloric intake during pregnancy was discussed with the patient at length. Continue to monitor U/A at subsequent visits.

## 2019-06-06 ENCOUNTER — HOSPITAL ENCOUNTER (OUTPATIENT)
Dept: PERINATAL CARE | Age: 29
Discharge: HOME | End: 2019-06-06
Attending: OBSTETRICS & GYNECOLOGY
Payer: SELF-PAY

## 2019-06-06 PROCEDURE — 76805 OB US >/= 14 WKS SNGL FETUS: CPT | Performed by: OBSTETRICS & GYNECOLOGY

## 2019-06-13 ENCOUNTER — ROUTINE PRENATAL (OUTPATIENT)
Dept: FAMILY MEDICINE CLINIC | Age: 29
End: 2019-06-13

## 2019-06-13 VITALS
OXYGEN SATURATION: 98 % | RESPIRATION RATE: 16 BRPM | HEIGHT: 62 IN | TEMPERATURE: 98.7 F | HEART RATE: 93 BPM | DIASTOLIC BLOOD PRESSURE: 60 MMHG | WEIGHT: 147 LBS | SYSTOLIC BLOOD PRESSURE: 89 MMHG | BODY MASS INDEX: 27.05 KG/M2

## 2019-06-13 DIAGNOSIS — Z34.82 ENCOUNTER FOR SUPERVISION OF OTHER NORMAL PREGNANCY IN SECOND TRIMESTER: Primary | ICD-10-CM

## 2019-06-13 LAB
BILIRUB UR QL STRIP: NEGATIVE
CHLAMYDIA, EXTERNAL: NEGATIVE
GLUCOSE UR-MCNC: NEGATIVE MG/DL
KETONES P FAST UR STRIP-MCNC: NEGATIVE MG/DL
N. GONORRHEA, EXTERNAL: NEGATIVE
PH UR STRIP: 7 [PH] (ref 4.6–8)
PROT UR QL STRIP: NEGATIVE
SP GR UR STRIP: 1.01 (ref 1–1.03)
UA UROBILINOGEN AMB POC: NORMAL (ref 0.2–1)
URINALYSIS CLARITY POC: CLEAR
URINALYSIS COLOR POC: YELLOW
URINE BLOOD POC: NEGATIVE
URINE LEUKOCYTES POC: NEGATIVE
URINE NITRITES POC: NEGATIVE

## 2019-06-13 NOTE — PATIENT INSTRUCTIONS
Adam Rom a busch médico yola el embarazo (después de 20 semanas) - [ Learning About When to Call Your Doctor During Pregnancy (After 20 Weeks) ]  Instrucciones de cuidado  Es normal que tenga inquietudes acerca de lo que podría ser un problema yola el [facility]. Aunque la mayoría de las mujeres embarazadas no tienen ningún problema grave, es importante saber cuándo llamar a busch médico si tiene determinados síntomas o señales de trabajo de [place]. Estas son algunas sugerencias generales. Busch médico puede darle más información sobre cuándo llamar. Cuándo llamar a busch médico (después de 20 semanas)  Llame al 911 en cualquier momento que sospeche que puede necesitar atención de [place]. Por ejemplo, llame si:  · Tiene sangrado vaginal intenso. · Tiene dolor repentino e intenso en el abdomen. · Se desmayó (perdió el conocimiento). · Tiene raimundo convulsión. · Ve o siente el cordón umbilical.  · Suellen que está a punto de chrissy a gaurav a busch bebé y no puede llegar en forma crow al hospital.  [place] a busch médico ahora mismo o busque atención médica inmediata si:  · Tiene sangrado vaginal.  · Tiene dolor en el abdomen. · Tiene fiebre. · Tiene síntomas de preeclampsia, tales christel:  ? Hinchazón repentina de la delma, las mani o los pies. ? Problemas nuevos con la visión (christel oscurecimiento de la visión o visión borrosa). ? Dolor de messi intenso. · Tiene raimundo pérdida repentina de líquido por la vagina. (Piensa que rompió la martha). · Suellen que puede estar en [place] significa que usted ha tenido al menos 4 contracciones en 20 minutos o al menos 8 contracciones en [employer]. · Nota que busch bebé ha dejado de moverse o lo hace mucho menos de lo habitual.  · Tiene síntomas de raimundo infección del tracto urinario. Estos pueden incluir:  ? Dolor o ardor al orinar. ? Necesidad de orinar con frecuencia sin poder eliminar mucha orina.   ? Dolor en el flanco, que se encuentra zenaida debajo de la caja torácica y [place] de la cintura en un lado de la espalda. ? [employer]. Preste especial atención a los cambios en busch jory y asegúrese de comunicarse con busch médico si:  · Tiene flujo vaginal con un olor desagradable. · Tiene cambios en la piel, tales christel:  ? Salpullido. ? Comezón. ? Color amarillento en la piel. · Tiene otras inquietudes acerca de busch embarazo. Si tiene signos de trabajo de parto al llegar a las [address] [facility] o más  Si tiene señales de [place] de parto a las 37 semanas o [place], es posible que busch médico le diga que llame cuando busch trabajo de parto se vuelva más [place]. Los síntomas del trabajo de parto activo incluyen:  · Contracciones que son regulares. · Contracciones a intervalos de menos de 5 minutos. · Contracciones yola las cuales es difícil hablar. La atención de seguimiento es raimundo parte clave de busch tratamiento y seguridad. Asegúrese de hacer y acudir a todas las citas, y llame a busch médico si está teniendo problemas. También es raimundo buena idea saber los resultados de kolby exámenes y mantener raimundo lista de los medicamentos que milan. ¿Dónde puede encontrar más información en inglés? Silvino Engel a http://bill-abhishek.info/. Escriba N153 en la búsqueda para aprender más acerca de \"Aprenda cuándo llamar a busch médico yola el embarazo (después de 20 semanas) - [ Learning About When to Call Your Doctor During Pregnancy (After 20 Weeks) ]. \"  Revisado: 5 septiembre, 2018  Versión del contenido: 11.9  © 1647-9787 [employer]. Las instrucciones de cuidado fueron adaptadas bajo licencia por [employer] (which disclaims liability or warranty for this information). Si usted tiene [place] afección médica o sobre estas instrucciones, siempre pregunte a busch profesional de jory. [facility] niega toda garantía o responsabilidad por busch uso de esta información. Semanas 22 a 26 de busch embarazo:  Instrucciones de cuidado - [ Francesca Riding 22 to 26 of Your Pregnancy: Care Instructions ]  Instrucciones de cuidado    Al comenzar el 7.º mes de busch embarazo en la semana 26, los pulmones de busch bebé se están volviendo más lino y se están preparando para respirar. Podría notar que busch bebé responde al sofia de busch voz o la de busch devaughn. También podría notar que busch bebé se voltea y [place] menos de posición, y se retuerce o sacude más. Por lo general, las sacudidas indican que busch bebé tiene hipo. Tener hipo es totalmente normal y dura poco tiempo. Christopher vez sea buena idea pensar en asistir a raimundo clase de preparación para el parto. Tanya es también un buen momento para comenzar a pensar si desea que yola el parto le administren un analgésico (medicamento para el dolor). A la mayoría de las mujeres embarazadas les hacen pruebas para la diabetes gestacional entre las semanas 24 y 29. La diabetes gestacional ocurre cuando el nivel de azúcar en la jerrod es muy alto yola el [facility]. La prueba es importante, porque usted puede tener diabetes gestacional y no saberlo. Vincenzo esta enfermedad puede causarle problemas a busch bebé. La atención de seguimiento es raimundo parte clave de busch tratamiento y seguridad. Asegúrese de hacer y acudir a todas las citas, y llame a busch médico si está teniendo problemas. También es raimundo buena idea saber los resultados de kolby exámenes y mantener raimundo lista de los medicamentos que milan. ¿Cómo puede cuidarse en el hogar? Alivie las molestias de las patadas de busch bebé  · Cambie de posición. A veces, tanya cambio hace que busch bebé también cambie de posición. · Respire hondo al mismo tiempo que levanta los brazos sobre busch [place]. Después exhale a medida que baja los brazos. Roland los ejercicios de [place] para evitar pérdidas de [place]  · [employer] Arnaldo ejercicios de [place]eg[place] estando dickens o sentada. ? Apriete los mismos músculos que usaría para detener el chorro de [place]. El abdomen y los muslos no deben moverse.   ? Manténgalos apretados yola 3 segundos y, luego, relájelos otros 3 segundos. ? Empiece con 3 segundos. Bud Marcus, añada 1 giuliano cada semana hasta que sea capaz de apretar yola 10 segundos. ? Repita el ejercicio entre 10 y 13 veces [address] Alexia Priest Roland toñito o más sesiones cada día. Alivie o reduzca la hinchazón de los pies, los tobillos, las mani y los dedos  · Si tiene los dedos hinchados, quítese los [place]. · No coma alimentos con mucha sal, christel preet fritas. · Coloque kolby pies sobre un banco o un sofá todo el tiempo que le sea posible. Duerma con almohadas debajo de los pies. · No se quede de pie yola mucho tiempo ni use calzado ajustado. · Use medias elásticas de soporte. ¿Dónde puede encontrar más información en inglés? Lamin Ashley a http://bill-abhishek.info/. Escriba G264 en la búsqueda para aprender más acerca de \"Semanas 22 a 26 de busch embarazo: Instrucciones de cuidado - [ Forrest Sane 22 to 26 of Your Pregnancy: Care Instructions ]. \"  Revisado: 5 septiembre, 2018  Versión del contenido: 11.9  © 6984-9587 Healthwise, Incorporated. Las instrucciones de cuidado fueron adaptadas bajo licencia por [employer] (which disclaims liability or warranty for this information). Si usted tiene [place] afección médica o sobre estas instrucciones, siempre pregunte a busch profesional de jory. Healthwise, Incorporated niega toda garantía o responsabilidad por busch uso de esta información.

## 2019-06-13 NOTE — PROGRESS NOTES
Return OB Visit       Subjective:   Piedad Schuster 26 y.o.   SABAS: 10/21/2019, by Last Menstrual Period  GA:  21w3d. LOF: none  Vaginal bleeding: none  Fetal movement: present  Contractions: none      Allergies- reviewed:   No Known Allergies  Medications- reviewed:   Current Outpatient Medications   Medication Sig    PNV No12-Iron-FA-DSS-OM-3 29 mg iron-1 mg -50 mg CPKD Take  by mouth.  naproxen (NAPROSYN) 500 mg tablet Take 1 Tab by mouth two (2) times daily (with meals).  methocarbamol (ROBAXIN) 500 mg tablet Take 1 Tab by mouth three (3) times daily as needed. No current facility-administered medications for this visit.       Past Medical History- reviewed:  Past Medical History:   Diagnosis Date    Breast disorder      Past Surgical History- reviewed:   Past Surgical History:   Procedure Laterality Date    HX BREAST LUMPECTOMY Right      Social History- reviewed:  Social History     Socioeconomic History    Marital status:      Spouse name: Not on file    Number of children: Not on file    Years of education: Not on file    Highest education level: Not on file   Occupational History    Not on file   Social Needs    Financial resource strain: Not on file    Food insecurity:     Worry: Not on file     Inability: Not on file    Transportation needs:     Medical: Not on file     Non-medical: Not on file   Tobacco Use    Smoking status: Never Smoker    Smokeless tobacco: Never Used   Substance and Sexual Activity    Alcohol use: Never     Frequency: Never    Drug use: Never    Sexual activity: Yes     Partners: Male   Lifestyle    Physical activity:     Days per week: Not on file     Minutes per session: Not on file    Stress: Not on file   Relationships    Social connections:     Talks on phone: Not on file     Gets together: Not on file     Attends Jainism service: Not on file     Active member of club or organization: Not on file     Attends meetings of clubs or organizations: Not on file     Relationship status: Not on file    Intimate partner violence:     Fear of current or ex partner: Not on file     Emotionally abused: Not on file     Physically abused: Not on file     Forced sexual activity: Not on file   Other Topics Concern    Not on file   Social History Narrative    Not on file     Immunizations- reviewed: There is no immunization history on file for this patient. Objective:     Visit Vitals  BP (!) 89/60   Pulse 93   Temp 98.7 °F (37.1 °C) (Oral)   Resp 16   Ht 5' 1.5\" (1.562 m)   Wt 147 lb (66.7 kg)   LMP 2019 (Exact Date)   SpO2 98%   BMI 27.33 kg/m²       Physical Exam:  GENERAL APPEARANCE: alert, well appearing, in no apparent distress  ABDOMEN: gravid, fundal height 22 cm, FHT present at 150 bpm  PSYCH: normal mood and affect    Labs  Recent Results (from the past 12 hour(s))   AMB POC URINALYSIS DIP STICK AUTO W/O MICRO    Collection Time: 19 11:28 AM   Result Value Ref Range    Color (UA POC) Yellow     Clarity (UA POC) Clear     Glucose (UA POC) Negative Negative    Bilirubin (UA POC) Negative Negative    Ketones (UA POC) Negative Negative    Specific gravity (UA POC) 1.010 1.001 - 1.035    Blood (UA POC) Negative Negative    pH (UA POC) 7.0 4.6 - 8.0    Protein (UA POC) Negative Negative    Urobilinogen (UA POC) 0.2 mg/dL 0.2 - 1    Nitrites (UA POC) Negative Negative    Leukocyte esterase (UA POC) Negative Negative         Assessment         ICD-10-CM ICD-9-CM    1. Encounter for supervision of other normal pregnancy in second trimester Z34.82 V22.1 AMB POC URINALYSIS DIP STICK AUTO W/O MICRO      CHLAMYDIA/GC PCR         Plan   26 y.o.  21w3d by LMPc/w 13wk US (SABAS 10/21/2019) here for return OB visit     PNL: A+, HgB Fract neg, HepBsAg neg, HIV NR, Varicella/Rubella immune, RPR NR, CBC WNL, PAP NSIL. Declined genetic screening    · SIUP At 21wk3d. Pregnancy has been uncomplicated.  BP WNL, UA unremarkable. · Anatomy scan demonstrated normal anatomy  · Resending Gonorrhea/Chlamydia today - did not result on initial labs  · GTT at next visit  · Plan for   · Follow up in 4 weeks with Dr Lico Nichols 2019    · Other  · Continuity Provider: Lico Nichols  · Pain mgmt. in labor: TBD  · Feeding: breast/bottle - undecided      Orders Placed This Encounter    CHLAMYDIA/GC PCR     Order Specific Question:   Sample source     Answer:   Urine [258]     Order Specific Question:   Specimen source     Answer:   Urine [258]    AMB POC URINALYSIS DIP STICK AUTO W/O MICRO     Labor precautions discussed, including: Regular painful contractions, lasting for greater than one hour, taking your breath away; any vaginal bleeding; any leakage of fluid; or absent or decreased fetal movement. Call M.D. on call if any of these symptoms or signs occur. I have discussed the diagnosis with the patient and the intended plan as seen in the above orders. The patient has received an after-visit summary and questions were answered concerning future plans. I have discussed medication side effects and warnings with the patient as well. Informed pt to return to the office or go to the ER if she experiences vaginal bleeding, vaginal discharge, leaking of fluid, pelvic cramping.     Patient discussed with Dr. Levi Doss (attending physician)    Tracie Batista MD  Family Medicine Resident

## 2019-06-13 NOTE — PROGRESS NOTES
Chief Complaint   Patient presents with    Routine Prenatal Visit     Aranza oJse 3d today. No bleeding or LOF.  +FM. 1. Have you been to the ER, urgent care clinic since your last visit? Hospitalized since your last visit? No    2. Have you seen or consulted any other health care providers outside of the [address] [place] [address] since your last visit? Include any pap smears or colon screening.  No

## 2019-06-15 LAB
C TRACH RRNA SPEC QL NAA+PROBE: NEGATIVE
N GONORRHOEA RRNA SPEC QL NAA+PROBE: NEGATIVE

## 2019-07-11 ENCOUNTER — ROUTINE PRENATAL (OUTPATIENT)
Dept: FAMILY MEDICINE CLINIC | Age: 29
End: 2019-07-11

## 2019-07-11 VITALS
OXYGEN SATURATION: 98 % | HEART RATE: 87 BPM | HEIGHT: 62 IN | SYSTOLIC BLOOD PRESSURE: 92 MMHG | RESPIRATION RATE: 18 BRPM | BODY MASS INDEX: 27.33 KG/M2 | TEMPERATURE: 98 F | DIASTOLIC BLOOD PRESSURE: 58 MMHG

## 2019-07-11 DIAGNOSIS — Z34.82 ENCOUNTER FOR SUPERVISION OF OTHER NORMAL PREGNANCY IN SECOND TRIMESTER: Primary | ICD-10-CM

## 2019-07-11 LAB
BILIRUB UR QL STRIP: NEGATIVE
GLUCOSE UR-MCNC: NEGATIVE MG/DL
GTT, 1 HR, GLUCOLA, EXTERNAL: 102
KETONES P FAST UR STRIP-MCNC: NEGATIVE MG/DL
PH UR STRIP: 7.5 [PH] (ref 4.6–8)
PROT UR QL STRIP: NEGATIVE
SP GR UR STRIP: 1.01 (ref 1–1.03)
UA UROBILINOGEN AMB POC: NORMAL (ref 0.2–1)
URINALYSIS CLARITY POC: NORMAL
URINALYSIS COLOR POC: YELLOW
URINE BLOOD POC: NEGATIVE
URINE LEUKOCYTES POC: NORMAL
URINE NITRITES POC: NEGATIVE

## 2019-07-11 NOTE — PROGRESS NOTES
Identified Patient with two Patient identifiers (Name and ). Two Patient Identifiers confirmed. Reviewed record in preparation for visit and have obtained necessary documentation. Chief Complaint   Patient presents with    Routine Prenatal Visit     25 Weeks 3 Days  -      Patient in the office to for routine prenatal appointment today. Patient denies vaginal discharge, abnormal vaginal spotting/bleeding or fluid leakage. Patient also denies abdominal pain/cramping/discomfort or contractions. States she is compliant with taking prenatal vitamins as prescribed. No further concerns voiced at this time       Visit Vitals  BP 92/58 (BP 1 Location: Left arm, BP Patient Position: Sitting)   Pulse 87   Temp 98 °F (36.7 °C) (Oral)   Resp 18   Ht 5' 1.5\" (1.562 m)   SpO2 98%   BMI 27.33 kg/m²       1. Have you been to the ER, urgent care clinic since your last visit? Hospitalized since your last visit? No    2. Have you seen or consulted any other health care providers outside of the [address] [place] [address] since your last visit? Include any pap smears or colon screening.  No

## 2019-07-11 NOTE — PROGRESS NOTES
25 w 3 d    + fetal movement    92/58    S/p  x 2    EDC 10/21/19    I reviewed with the resident the medical history and the resident's findings on the physical examination. I discussed with the resident the patient's diagnosis and concur with the plan.

## 2019-07-11 NOTE — PROGRESS NOTES
Return OB Visit       Subjective:   Yamileth Camacho 26 y.o.   SABAS: 10/21/2019, by Last Menstrual Period  GA:  25w3d. LOF: none  Vaginal bleeding: none  Fetal movement: yes, frequently  Contractions: none      Allergies- reviewed:   No Known Allergies  Medications- reviewed:   Current Outpatient Medications   Medication Sig    PNV No12-Iron-FA-DSS-OM-3 29 mg iron-1 mg -50 mg CPKD Take  by mouth. No current facility-administered medications for this visit.       Past Medical History- reviewed:  Past Medical History:   Diagnosis Date    Breast disorder      Past Surgical History- reviewed:   Past Surgical History:   Procedure Laterality Date    HX BREAST LUMPECTOMY Right 2013     Social History- reviewed:  Social History     Socioeconomic History    Marital status:      Spouse name: Not on file    Number of children: Not on file    Years of education: Not on file    Highest education level: Not on file   Occupational History    Not on file   Social Needs    Financial resource strain: Not on file    Food insecurity:     Worry: Not on file     Inability: Not on file    Transportation needs:     Medical: Not on file     Non-medical: Not on file   Tobacco Use    Smoking status: Never Smoker    Smokeless tobacco: Never Used   Substance and Sexual Activity    Alcohol use: Never     Frequency: Never    Drug use: Never    Sexual activity: Yes     Partners: Male   Lifestyle    Physical activity:     Days per week: Not on file     Minutes per session: Not on file    Stress: Not on file   Relationships    Social connections:     Talks on phone: Not on file     Gets together: Not on file     Attends [employer] service: Not on file     Active member of club or organization: Not on file     Attends meetings of clubs or organizations: Not on file     Relationship status: Not on file    Intimate partner violence:     Fear of current or ex partner: Not on file     Emotionally abused: Not on file     Physically abused: Not on file     Forced sexual activity: Not on file   Other Topics Concern    Not on file   Social History Narrative    Not on file     Immunizations- reviewed: There is no immunization history on file for this patient. Objective:     Visit Vitals  BP 92/58 (BP 1 Location: Left arm, BP Patient Position: Sitting)   Pulse 87   Temp 98 °F (36.7 °C) (Oral)   Resp 18   Ht 5' 1.5\" (1.562 m)   LMP 2019 (Exact Date)   SpO2 98%   BMI 27.33 kg/m²       Physical Exam:  GENERAL APPEARANCE: alert, well appearing, in no apparent distress  ABDOMEN: gravid, fundal height 25 cm, FHT present at 160bpm  PSYCH: normal mood and affect    Labs  No results found for this or any previous visit (from the past 12 hour(s)). Assessment         ICD-10-CM ICD-9-CM    1. Encounter for supervision of other normal pregnancy in second trimester Z34.82 V22.1 AMB POC URINALYSIS DIP STICK AUTO W/O MICRO      CBC W/O DIFF      GLUCOSE, GESTATIONAL 1 HR TOLERANCE         Plan   26 y.o.  25w3d SABAS 10/21/2019, by Last Menstrual Period here for return OB visit     PNL: A+, HgB Fract neg, HepBsAg neg, HIV NR, Varicella/Rubella immune, G/C neg, RPR NR, CBC WNL, PAP NSIL. Declined genetic screening.     · SIUP At 25wk3d. Pregnancy has been uncomplicated. BP WNL, UA unremarkable. ? Anatomy scan demonstrated normal anatomy  ? Tdap at next visit   ? GTT today  ? Plan for   ? Follow up in 4 weeks with Dr Francisco Reason    · Other  · Continuity Provider: Maryam Reason  · Pain mgmt.  in labor: planning for unmedicated, was unmedicated with first two deliveries  · Feeding:  breastfeeding  · Social: expecting baby girl Tiki      Orders Placed This Encounter    CBC W/O DIFF    GLUCOSE, GESTATIONAL 1 HR TOLERANCE    AMB POC URINALYSIS DIP STICK AUTO W/O MICRO     Labor precautions discussed, including: Regular painful contractions, lasting for greater than one hour, taking your breath away; any vaginal bleeding; any leakage of fluid; or absent or decreased fetal movement. Call M.D. on call if any of these symptoms or signs occur. I have discussed the diagnosis with the patient and the intended plan as seen in the above orders. The patient has received an after-visit summary and questions were answered concerning future plans. I have discussed medication side effects and warnings with the patient as well. Informed pt to return to the office or go to the ER if she experiences vaginal bleeding, vaginal discharge, leaking of fluid, pelvic cramping.     Patient discussed with Dr. Shayan Isaac (attending physician)    Franky Villarreal MD  Family Medicine Resident

## 2019-07-11 NOTE — PATIENT INSTRUCTIONS
Aprenda cuándo llamar a busch médico yola el embarazo (después de 20 semanas) - [ Learning About When to Call Your Doctor During Pregnancy (After 20 Weeks) ] Instrucciones de cuidado Es normal que tenga inquietudes acerca de lo que podría ser un problema yola el [facility]. Aunque la mayoría de las mujeres embarazadas no tienen ningún problema grave, es importante saber cuándo llamar a busch médico si tiene determinados síntomas o señales de trabajo de [place]. Estas son algunas sugerencias generales. Busch médico puede darle más información sobre cuándo llamar. Cuándo llamar a busch médico (después de 20 semanas) Llame al 911 en cualquier momento que sospeche que puede necesitar atención de [place]. Por ejemplo, llame si: · Tiene sangrado vaginal intenso. · Tiene dolor repentino e intenso en el abdomen. · Se desmayó (perdió el conocimiento). · Tiene raimundo convulsión. · Ve o siente el cordón umbilical. 
· Suellen que está a punto de chrissy a gaurav a busch bebé y no puede llegar en forma crow al hospital. 
Vidya Zuleta a busch médico ahora mismo o busque atención médica inmediata si: · Tiene sangrado vaginal. 
· Tiene dolor en el abdomen. · Tiene fiebre. · Tiene síntomas de preeclampsia, tales christel: 
? Hinchazón repentina de la delma, las mani o los pies. ? Problemas nuevos con la visión (christel oscurecimiento de la visión o visión borrosa). ? Dolor de messi intenso. · Tiene raimundo pérdida repentina de líquido por la vagina. (Piensa que rompió la martha). · Suellen que puede estar en [place]. Tebbetts significa que usted ha tenido al menos 4 contracciones en 20 minutos o al menos 8 contracciones en [place] Cirri. · Nota que busch bebé ha dejado de moverse o lo hace mucho menos de lo habitual. 
· Tiene síntomas de raimundo infección del tracto urinario. Estos pueden incluir: ? Dolor o ardor al orinar. ? Necesidad de orinar con frecuencia sin poder eliminar mucha orina. ? Dolor en el flanco, que se encuentra zenaida debajo de la caja torácica y [place] de la cintura en un lado de la espalda. ? [employer]. Preste especial atención a los cambios en busch jory y asegúrese de comunicarse con busch médico si: · Tiene flujo vaginal con un olor desagradable. · Tiene cambios en la piel, tales christel: 
? Salpullido. ? Comezón. ? Color amarillento en la piel. · Tiene otras inquietudes acerca de busch embarazo. Si tiene signos de trabajo de parto al llegar a las [address] Si tiene señales de [place] a las [address] [facility] o [address] [facility], es posible que busch médico le diga que llame cuando busch trabajo de parto se vuelva [place] [facility]. Los síntomas del trabajo de parto activo incluyen: · Contracciones que son regulares. · Contracciones a intervalos de menos de 5 minutos. · Contracciones yola las cuales es difícil hablar. La atención de seguimiento es raimundo parte clave de busch tratamiento y seguridad. Asegúrese de hacer y acudir a todas las citas, y llame a busch médico si está teniendo problemas. También es raimundo buena idea saber los resultados de kolby exámenes y mantener raimundo lista de los medicamentos que milan. Dónde puede encontrar más información en inglés? Venessa Frye a http://bill-abhishek.info/. Escriba B139 en la búsqueda para aprender más acerca de \"Aprenda cuándo llamar a busch médico yola el embarazo (después de 20 semanas) - [ Learning About When to Call Your Doctor During Pregnancy (After 20 Weeks) ]. \" 
Revisado: 5 septiembre, 2018 Versión del contenido: 11.9 © 3089-8525 Healthwise, Incorporated. Las instrucciones de cuidado fueron adaptadas bajo licencia por [employer] (which disclaims liability or warranty for this information). Si usted tiene [place] afección médica o sobre estas instrucciones, siempre pregunte a busch profesional de jory. Healthwise, Incorporated niega toda garantía o responsabilidad por busch uso de esta información. Semanas 22 a 26 de busch embarazo: Instrucciones de cuidado - [ [facility] 22 to 26 of Your Pregnancy: Care Instructions ] Instrucciones de cuidado Al comenzar el 7.º mes de busch embarazo en la semana 32, los pulmones de busch bebé se están volviendo más lino y se están preparando para respirar. Podría notar que busch bebé responde al sofia de busch voz o la de busch devaughn. También podría notar que busch bebé se voltea y [place] menos de posición, y se retuerce o sacude más. Por lo general, las sacudidas indican que busch bebé tiene hipo. Tener hipo es totalmente normal y dura poco tiempo. Christopher vez sea buena idea pensar en asistir a raimundo clase de preparación para el parto. Tanya es también un buen momento para comenzar a pensar si desea que yola el parto le administren un analgésico (medicamento para el dolor). A la mayoría de las mujeres embarazadas les hacen pruebas para la diabetes gestacional entre las semanas 24 y 29. La diabetes gestacional ocurre cuando el nivel de azúcar en la jerrod es muy alto yola el [facility]. La prueba es importante, porque usted puede tener diabetes gestacional y no saberlo. Vincenzo esta enfermedad puede causarle problemas a busch bebé. La atención de seguimiento es raimundo parte clave de busch tratamiento y seguridad. Asegúrese de hacer y acudir a todas las citas, y llame a busch médico si está teniendo problemas. También es raimundo buena idea saber los resultados de kolby exámenes y mantener raimundo lista de los medicamentos que milan. Cómo puede cuidarse en el hogar? Alivie las molestias de las patadas de busch bebé · Cambie de posición. A veces, tanya cambio hace que busch bebé también cambie de posición. · Respire hondo al mismo tiempo que levanta los brazos sobre busch [place]. Después exhale a medida que baja los brazos. Roland los ejercicios de Kegel para evitar pérdidas de [facility] · Puede hacer los ejercicios de Kegel estando dickens o sentada. ? Apriete los mismos músculos que usaría para detener el chorro de [place]. El abdomen y los muslos no deben moverse. ? Manténgalos apretados yola 3 segundos y, luego, relájelos otros 3 segundos. ? Empiece con 3 segundos. Aneita Sida, añada 1 giuliano cada semana hasta que sea capaz de apretar yola 10 segundos. ? Repita el ejercicio entre 10 y 13 veces [address] Roland toñito o más sesiones cada día. Alivie o reduzca la hinchazón de los pies, los tobillos, las mani y los dedos · Si tiene los dedos hinchados, quítese los [place]. · No coma alimentos con mucha sal, christel preet fritas. · Coloque kolby pies sobre un banco o un sofá todo el tiempo que le sea posible. Duerma con almohadas debajo de los pies. · No se quede de pie yola mucho tiempo ni use calzado ajustado. · Use medias elásticas de soporte. Dónde puede encontrar más información en inglés? Vladimir Murphy a http://bill-abhishek.info/. Escriba G264 en la búsqueda para aprender más acerca de \"Semanas 22 a 26 de busch embarazo: Instrucciones de cuidado - [ Mariellen Lightning 22 to 26 of Your Pregnancy: Care Instructions ]. \" 
Revisado: 5 septiembre, 2018 Versión del contenido: 11.9 © 5292-4815 Healthwise, Incorporated. Las instrucciones de cuidado fueron adaptadas bajo licencia por [employer] (which disclaims liability or warranty for this information). Si usted tiene [place] afección médica o sobre estas instrucciones, siempre pregunte a busch profesional de jory. Healthwise, Incorporated niega toda garantía o responsabilidad por busch uso de esta información.

## 2019-07-12 DIAGNOSIS — O99.012 ANEMIA DURING PREGNANCY IN SECOND TRIMESTER: Primary | ICD-10-CM

## 2019-07-12 LAB
ERYTHROCYTE [DISTWIDTH] IN BLOOD BY AUTOMATED COUNT: 14.4 % (ref 12.3–15.4)
GLUCOSE 1H P 50 G GLC PO SERPL-MCNC: 102 MG/DL (ref 65–139)
HCT VFR BLD AUTO: 29.1 % (ref 34–46.6)
HGB BLD-MCNC: 9 G/DL (ref 11.1–15.9)
MCH RBC QN AUTO: 23.9 PG (ref 26.6–33)
MCHC RBC AUTO-ENTMCNC: 30.9 G/DL (ref 31.5–35.7)
MCV RBC AUTO: 77 FL (ref 79–97)
PLATELET # BLD AUTO: 352 X10E3/UL (ref 150–450)
RBC # BLD AUTO: 3.76 X10E6/UL (ref 3.77–5.28)
WBC # BLD AUTO: 8.3 X10E3/UL (ref 3.4–10.8)

## 2019-07-12 RX ORDER — FERROUS SULFATE 325(65) MG
325 TABLET, DELAYED RELEASE (ENTERIC COATED) ORAL DAILY
Qty: 30 TAB | Refills: 2 | Status: SHIPPED | OUTPATIENT
Start: 2019-07-12

## 2019-08-09 ENCOUNTER — ROUTINE PRENATAL (OUTPATIENT)
Dept: FAMILY MEDICINE CLINIC | Age: 29
End: 2019-08-09

## 2019-08-09 VITALS
WEIGHT: 159 LBS | HEIGHT: 62 IN | BODY MASS INDEX: 29.26 KG/M2 | TEMPERATURE: 98.3 F | HEART RATE: 102 BPM | DIASTOLIC BLOOD PRESSURE: 60 MMHG | SYSTOLIC BLOOD PRESSURE: 98 MMHG | OXYGEN SATURATION: 97 % | RESPIRATION RATE: 16 BRPM

## 2019-08-09 DIAGNOSIS — Z34.93 PRENATAL CARE IN THIRD TRIMESTER: Primary | ICD-10-CM

## 2019-08-09 LAB
BILIRUB UR QL STRIP: NEGATIVE
GLUCOSE UR-MCNC: NEGATIVE MG/DL
KETONES P FAST UR STRIP-MCNC: NEGATIVE MG/DL
PH UR STRIP: 7 [PH] (ref 4.6–8)
PROT UR QL STRIP: NEGATIVE
SP GR UR STRIP: 1.01 (ref 1–1.03)
UA UROBILINOGEN AMB POC: NORMAL (ref 0.2–1)
URINALYSIS CLARITY POC: CLEAR
URINALYSIS COLOR POC: YELLOW
URINE BLOOD POC: NEGATIVE
URINE LEUKOCYTES POC: NEGATIVE
URINE NITRITES POC: NEGATIVE

## 2019-08-09 NOTE — PROGRESS NOTES
Return OB Visit       Subjective:   Warden Mcbride 26 y.o.   SABAS: 10/21/2019, by Last Menstrual Period  GA:  29w4d. LOF: none  Vaginal bleeding: none  Fetal movement: yes, frequent   Contractions: none    Odor to urine     Allergies- reviewed:   No Known Allergies  Medications- reviewed:   Current Outpatient Medications   Medication Sig    ferrous sulfate (IRON) 325 mg (65 mg iron) EC tablet Take 1 Tab by mouth daily.  PNV No12-Iron-FA-DSS-OM-3 29 mg iron-1 mg -50 mg CPKD Take  by mouth. No current facility-administered medications for this visit.       Past Medical History- reviewed:  Past Medical History:   Diagnosis Date    Breast disorder      Past Surgical History- reviewed:   Past Surgical History:   Procedure Laterality Date    HX BREAST LUMPECTOMY Right 2013     Social History- reviewed:  Social History     Socioeconomic History    Marital status:      Spouse name: Not on file    Number of children: Not on file    Years of education: Not on file    Highest education level: Not on file   Occupational History    Not on file   Social Needs    Financial resource strain: Not on file    Food insecurity:     Worry: Not on file     Inability: Not on file    Transportation needs:     Medical: Not on file     Non-medical: Not on file   Tobacco Use    Smoking status: Never Smoker    Smokeless tobacco: Never Used   Substance and Sexual Activity    Alcohol use: Never     Frequency: Never    Drug use: Never    Sexual activity: Yes     Partners: Male   Lifestyle    Physical activity:     Days per week: Not on file     Minutes per session: Not on file    Stress: Not on file   Relationships    Social connections:     Talks on phone: Not on file     Gets together: Not on file     Attends [employer] service: Not on file     Active member of club or organization: Not on file     Attends meetings of clubs or organizations: Not on file     Relationship status: Not on file   [facility] Intimate partner violence:     Fear of current or ex partner: Not on file     Emotionally abused: Not on file     Physically abused: Not on file     Forced sexual activity: Not on file   Other Topics Concern    Not on file   Social History Narrative    Not on file     Immunizations- reviewed:   Immunization History   Administered Date(s) Administered    Tdap 2019       Objective:     Visit Vitals  BP 98/60   Pulse (!) 102   Temp 98.3 °F (36.8 °C) (Oral)   Resp 16   Ht 5' 1.5\" (1.562 m)   Wt 159 lb (72.1 kg)   LMP 2019 (Exact Date)   SpO2 97%   BMI 29.56 kg/m²       Physical Exam:  GENERAL APPEARANCE: alert, well appearing, in no apparent distress  ABDOMEN: gravid, fundal height 29 cm, FHT present at 145 bpm  PSYCH: normal mood and affect    Labs  No results found for this or any previous visit (from the past 12 hour(s)). Assessment         ICD-10-CM ICD-9-CM    1. Prenatal care in third trimester Z34.93 V22.1 AMB POC URINALYSIS DIP STICK AUTO W/O MICRO      TETANUS, DIPHTHERIA TOXOIDS AND ACELLULAR PERTUSSIS VACCINE (TDAP), IN INDIVIDS. >=7, IM         Plan   26 y.o.  29w4d SABAS 10/21/2019, by Last Menstrual Period here for return OB visit     PNL: A+, HgB Fract neg, HepBsAg neg, HIV NR, Varicella/Rubella immune, G/C neg, RPR NR, CBC WNL, PAP NSIL. Declined genetic screening, GTT WNL, s/p Tdap    * SIUP at 29wk4d  - anatomy scan 19 No anatomical defects detected  - Tdap today  - Anemia   * continue FeSO4 325mg daily    * CBC at next visit   - UA unremarkable, likely malodorous urine due to dehydration, no burning, dysuria, frequency concerning for UTI however should these develop patient should return to clinic for evaluation  - f/U 2 weeks with Dr Francisco Reason    · Other  ? Continuity Provider: Maryam Reason  ? Pain mgmt. in labor: planning for unmedicated, was unmedicated with first two deliveries  ? Feeding:  breastfeeding  ?  Social: expecting baby girl Tiki      Orders Placed This Encounter  TETANUS, DIPHTHERIA TOXOIDS AND ACELLULAR PERTUSSIS VACCINE (TDAP), IN INDIVIDS. >=7, IM    AMB POC URINALYSIS DIP STICK AUTO W/O MICRO     Labor precautions discussed, including: Regular painful contractions, lasting for greater than one hour, taking your breath away; any vaginal bleeding; any leakage of fluid; or absent or decreased fetal movement. Call M.D. on call if any of these symptoms or signs occur. I have discussed the diagnosis with the patient and the intended plan as seen in the above orders. The patient has received an after-visit summary and questions were answered concerning future plans. I have discussed medication side effects and warnings with the patient as well. Informed pt to return to the office or go to the ER if she experiences vaginal bleeding, vaginal discharge, leaking of fluid, pelvic cramping.     Patient  discussed with Dr. Oswaldo Henry MD (attending physician)    Gela Hill MD  Family Medicine Resident

## 2019-08-09 NOTE — PATIENT INSTRUCTIONS
Wing Nkechi a busch médico yola el embarazo (después de 20 semanas) - [ Learning About When to Call Your Doctor During Pregnancy (After 20 Weeks) ]  Instrucciones de cuidado  Es normal que tenga inquietudes acerca de lo que podría ser un problema yola el Hurley Aase. Aunque la mayoría de las mujeres embarazadas no tienen ningún problema grave, es importante saber cuándo llamar a busch médico si tiene determinados síntomas o señales de trabajo de [place]. Estas son algunas sugerencias generales. Busch médico puede darle más información sobre cuándo llamar. Cuándo llamar a busch médico (después de 20 semanas)  Llame  en cualquier momento que considere que necesita atención de [place]. Por ejemplo, llame si:  · Tiene sangrado vaginal intenso. · Tiene dolor repentino e intenso en el abdomen. · Se desmayó (perdió el conocimiento). · Tiene raimundo convulsión. · Ve o siente el cordón umbilical.  · Suellen que está a punto de chrissy a gaurav a busch bebé y no puede llegar en forma crow al hospital.  Kiera Deist a busch médico ahora mismo o busque atención médica inmediata si:  · Tiene sangrado vaginal.  · Tiene dolor en el abdomen. · Tiene fiebre. · Tiene síntomas de preeclampsia, christel:  ? Hinchazón repentina de la delma, las mani o los pies. ? Nuevos problemas de visión (christel oscurecimiento, angel borroso o angel puntos). ? Dolor de messi intenso. · Tiene raimundo pérdida repentina de líquido por la vagina. (Piensa que rompió la martha). · Piensa que puede yohannes comenzado el [place]. [place] significa que ha tenido al menos 6 contracciones en [employer]. · Nota que busch bebé ha dejado de moverse o lo hace mucho menos de lo habitual.  · Tiene síntomas de raimundo infección urinaria. Estos pueden incluir:  ? Dolor o ardor al orinar. ? Necesidad de orinar con frecuencia sin poder eliminar mucha orina. ? Dolor en el flanco, que se encuentra zenaida debajo de la caja torácica y [place] de la cintura en un lado de la espalda.   ? Negro en la [facility]. Preste especial atención a los cambios en busch jory y asegúrese de comunicarse con busch médico si:  · Tiene flujo vaginal con un olor desagradable. · Tiene cambios en la piel, tales christel:  ? Salpullido. ? Comezón. ? Color amarillento en la piel. · Tiene otras inquietudes acerca de busch embarazo. Si tiene signos de trabajo de parto al llegar a las [address] o más  Si tiene señales de Karyle Huge de parto a las 37 semanas o [place], es posible que busch médico le diga que llame cuando busch trabajo de parto se vuelva más [place]. Los síntomas del trabajo de parto activo incluyen:  · Contracciones que son regulares. · Contracciones a intervalos de menos de 5 minutos. · Contracciones yola las cuales es difícil hablar. La atención de seguimiento es raimundo parte clave de busch tratamiento y seguridad. Asegúrese de hacer y acudir a todas las citas, y llame a busch médico si está teniendo problemas. También es raimundo buena idea saber los resultados de kolby exámenes y mantener raimundo lista de los medicamentos que milan. ¿Dónde puede encontrar más información en inglés? Venessa Frye a http://bill-abhishek.info/. Jesús K711 en la búsqueda para aprender más acerca de \"Aprenda cuándo llamar a busch médico yola el embarazo (después de 20 semanas) - [ Learning About When to Call Your Doctor During Pregnancy (After 20 Weeks) ]. \"  Revisado: 5 septiembre, 2018  Versión del contenido: 12.1  © 3390-4979 Healthwise, [place]. Las instrucciones de cuidado fueron adaptadas bajo licencia por [employer] (which disclaims liability or warranty for this information). Si usted tiene [place] afección médica o sobre estas instrucciones, siempre pregunte a busch profesional de jory. [facility] niega toda garantía o responsabilidad por busch uso de esta información. Semanas 30 a 32 de busch embarazo:  Instrucciones de cuidado - [ Marta Parraip 30 to 28 of Your Pregnancy: Care Instructions ]  Instrucciones de cuidado    Ha llegado a los últimos meses de embarazo. A estas [place], busch bebé en verdad comienza a tener la apariencia de un bebé, con magalie y piel rellenita. A medida que entra en las últimas semanas de [facility] comenzará a caer en la realidad de que va a tener un bebé. [place] es el momento de elegir un nombre, ordenar busch casa, organizar un cuarto de niños seguro y buscar atención infantil de calidad, de ser necesaria. Hacer esto con anterioridad le permitirá concentrarse en cuidar y disfrutar de busch bebé. También podría hacer raimundo visita a la daniel de partos del hospital para Nitin Shay mejor idea sobre qué esperar mientras está en el hospital.  Yola estos últimos meses, es muy importante que se cuide y preste atención a lo que busch cuerpo necesita. Si busch médico le dice que está honey que trabaje, no se exija demasiado. Siga las recomendaciones proporcionadas en esta hoja de cuidados para aliviar la acidez estomacal y [address] várices. Si todavía no le sommer aplicado la vacuna Tdap (tétanos, difteria y tos [place]) yola [place] [facility], hable con busch médico acerca de aplicársela. Jeneal Ormond a proteger a busch recién nacido contra la infección por tos ferina. La atención de seguimiento es raimundo parte clave de busch tratamiento y seguridad. Asegúrese de hacer y acudir a todas las citas, y llame a busch médico si está teniendo problemas. También es raimundo buena idea saber los resultados de kolby exámenes y mantener raimundo lista de los medicamentos que milan. ¿Cómo puede cuidarse en el hogar? Preste atención a los movimientos de busch bebé  · Debería sentir que busch bebé se mueve varias veces al día. · Busch bebé ahora cambia menos de posición, y patea y da golpes con más frecuencia. · Busch bebé duerme de 20 a 45 minutos cada vez y [address] en determinados momentos del día. · Si busch médico quiere que cuente las patadas de busch bebé:  ? Jason Meuse busch vejiga y acuéstese de lado o recuéstese en raimundo silla cómoda. ?  Anote la hora inicial.  ? Preste atención solo a los movimientos de busch bebé. Cuente todos los movimientos, excepto los del hipo. ? Después de que haya contado 10 movimientos, anote la hora. ? Anote cuántos minutos le llevaron a busch bebé los 10 movimientos. ? Si después de raimundo hora no ha registrado 10 movimientos, coma o yelena algo, y luego cuente por otra hora. Si no registra 10 movimientos en cualquiera de las horas, llame a busch médico.  Alivie la acidez estomacal  · Coma comidas pequeñas y frecuentes. · No coma chocolate, menta ni comidas muy picantes. Evite las bebidas con cafeína, christel el café, el té y las sodas. · Evite doblarse hacia adelante o acostarse después de comer. · Roland raimundo caminata corta después de comer. · Si tiene problemas de [place] estomacal yola la noche, no coma por 2 horas antes de acostarse. · [place] antiácidos, christel Mylanta, Maalox, Rolaids o Tums. No tome antiácidos que contengan bicarbonato de sodio. Cuide las várices  · Las várices son vasos sanguíneos que se dilatan debido a la mayor cantidad de jerrod yola el embarazo. Puede tener dolor o palpitaciones en las piernas. La mayoría de las várices desaparecerán después del [place]. · Evite estar dickens yola mucho tiempo. Siéntese con las piernas cruzadas a la altura de los tobillos, no de las rodillas. · Siéntese con los pies elevados. · Evite usar ropa o medias ajustadas. Use medias de compresión. · Roland ejercicio con regularidad. Trate de caminar por lo menos 30 minutos al día. ¿Dónde puede encontrar más información en inglés? Tomi Bautista a http://bill-abhishek.info/. Umm Ornelas E714 en la búsqueda para aprender más acerca de \"Semanas 30 a 28 de busch embarazo: Instrucciones de cuidado - [ Leonard Wyckoff 30 to 28 of Your Pregnancy: Care Instructions ]. \"  Revisado: 5 septiembre, 2018  Versión del contenido: 12.1  © 5261-3189 Healthwise, Incorporated.  Las instrucciones de cuidado fueron adaptadas bajo licencia por [employer] (which disclaims liability or warranty for this information). Si usted tiene [place] afección médica o sobre estas instrucciones, siempre pregunte a busch profesional de jory. [facility] niega toda garantía o responsabilidad por bucsh uso de esta información.

## 2019-08-09 NOTE — PROGRESS NOTES
25 yo  at 34 w 4 d    Iron deficiency anemia    + fetal movement    Denies bleeding or LOF    Will encourage hydration    I reviewed with the resident the medical history and the resident's findings on the physical examination. I discussed with the resident the patient's diagnosis and concur with the plan.

## 2019-08-30 ENCOUNTER — ROUTINE PRENATAL (OUTPATIENT)
Dept: FAMILY MEDICINE CLINIC | Age: 29
End: 2019-08-30

## 2019-08-30 VITALS
SYSTOLIC BLOOD PRESSURE: 113 MMHG | HEIGHT: 62 IN | OXYGEN SATURATION: 95 % | RESPIRATION RATE: 18 BRPM | WEIGHT: 161.6 LBS | HEART RATE: 98 BPM | DIASTOLIC BLOOD PRESSURE: 71 MMHG | TEMPERATURE: 98.3 F | BODY MASS INDEX: 29.74 KG/M2

## 2019-08-30 DIAGNOSIS — Z3A.32 32 WEEKS GESTATION OF PREGNANCY: Primary | ICD-10-CM

## 2019-08-30 LAB
BILIRUB UR QL STRIP: NEGATIVE
GLUCOSE UR-MCNC: NEGATIVE MG/DL
KETONES P FAST UR STRIP-MCNC: NEGATIVE MG/DL
PH UR STRIP: 7 [PH] (ref 4.6–8)
PROT UR QL STRIP: NEGATIVE
SP GR UR STRIP: 1.01 (ref 1–1.03)
UA UROBILINOGEN AMB POC: NORMAL (ref 0.2–1)
URINALYSIS CLARITY POC: CLEAR
URINALYSIS COLOR POC: YELLOW
URINE BLOOD POC: NEGATIVE
URINE LEUKOCYTES POC: NORMAL
URINE NITRITES POC: NEGATIVE

## 2019-08-30 NOTE — PROGRESS NOTES
28 w 4 d    27 yo     's    + fetal movement    Declined influenza vaccine and CBC due to cost of the procedure    I reviewed with the resident the medical history and the resident's findings on the physical examination. I discussed with the resident the patient's diagnosis and concur with the plan.

## 2019-08-30 NOTE — PATIENT INSTRUCTIONS
Reach Out to [facility] to See if Adela man [facility]  Physical Address   [address], [facility]     Phone: [phone]  Fax: [phone]       Semanas 32 a 29 de hernandez embarazo: Instrucciones de cuidado - [ Marta Appl 32 to 29 of Your Pregnancy: Care Instructions ]  Instrucciones de cuidado    Yola las últimas semanas de hernandez [place], usted podría sentir más padma y [place]. Es importante que descanse cuando pueda. Hernandez bebé en crecimiento está ejerciendo más presión sobre hernandez vejiga. Por eso, nilesh vez necesite orinar con más frecuencia. Las hemorroides también son comunes. Estas son venas en la alyse del recto que causan dolor y comezón. En la semana 36, a la mayoría de las [employer] un análisis para detectar el estreptococo del butch B (GBS, por kolby siglas en inglés). El GBS es raimundo bacteria común que puede vivir en la vagina y el recto. Podría enfermar a hernandez bebé después del nacimiento. Si el [employer] positivo, le darán antibióticos yola el Arturo Hoyos. Estos prevendrán que el bebé se contagie con la bacteria. Podría convenirle hablar con hernandez médico sobre poner en un banco la jerrod del cordón umbilical de hernandez bebé. Esta es la jerrod que queda en el cordón después del nacimiento. Si desea guardar esta jerrod, debe hacer los trámites necesarios con anticipación. No puede decidirlo en el último minuto. Si todavía no le sommer aplicado la vacuna Tdap (tétanos, difteria y tos [place]) yola tanya [facility], hable con hernandez médico acerca de aplicársela. Daril Messing a proteger a hernandez recién nacido contra la infección por tos ferina. La atención de seguimiento es raimundo parte clave de hernandez tratamiento y seguridad. Asegúrese de hacer y acudir a todas las citas, y llame a hernandez médico si está teniendo problemas. También es raimundo buena idea saber los resultados de kolby exámenes y mantener raimundo lista de los medicamentos que milan. ¿Cómo puede cuidarse en el hogar?   Tanisha hemorroides  · Aumente en busch dieta la cantidad de líquidos, frutas, verduras y [place]. [place] ayudará a [place] [employer]. · Evite estar sentada yola demasiado tiempo. Recuéstese sobre el lado mahnaz varias veces al día. · Límpiese con papel higiénico suave y húmedo. O puede utilizar compresas de infusión de hamamelis [facility] (\"witch hazel\") o toallas de higiene personal.  · Si tiene malestar, pruebe a usar compresas de hielo. O puede hacer charli de asiento tibios. Hágalos yola 20 minutos por vez, según lo necesite. · Use raimundo crema con hidrocortisona para el dolor y la picazón. Rodena Loth son Anusol y Preparation H Hydrocortisone. · Pregúntele a busch médico si puede kacy un ablandador de heces de venta rubén. Considere el amamantamiento  · Los expertos recomiendan que las mujeres amamanten por Angelito Pulido. La leche materna es el mejor alimento para los bebés. · A los bebés les resulta más fácil digerir la [employer] materna que la [employer] de [place]. Y siempre está disponible, tiene la temperatura ideal y es gratuita. · Diego ayudar a proteger a busch bebé de algunos problemas de jory. En comparación con los bebés alimentados con leche de [place], los bebés que se alimentan con leche materna tienen menos probabilidades de:  ? April Gene infecciones de oído, resfriados, diarrea y neumonía. ? Ser obesos o tener diabetes más adelante en busch hugo. · [employer] a ewa bebés tienen menos sangrado después del [place]. Ewa úteros también recobran busch tamaño normal más rápido. · Algunas mujeres que amamantan bajan de [place] rápido. Elaborar leche quema calorías. · El amamantamiento puede disminuir el riesgo de tener cáncer de seno (mama), cáncer de ovario y osteoporosis. Decida sobre la circuncisión para los niños  · Al kacy esta decisión, podría ser de ayuda pensar en ewa tradiciones personales, religiosas y familiares.  Es busch decisión si desea conservar el pene de busch hijo natural o circuncidar a busch hijo. · Si decide que le gustaría que circuncidaran a busch bebé, hable con busch médico. Discuta cualquier inquietud que tenga sobre el dolor. También puede hablar acerca de kolby preferencias para la anestesia. ¿Dónde puede encontrar más información en inglés? Vladimir Murphy a http://bill-abhishek.info/. Tex Yepez C112 en la búsqueda para aprender más acerca de \"Semanas 28 a 29 de busch embarazo: Instrucciones de cuidado - [ Renitan Lightning 32 to 29 of Your Pregnancy: Care Instructions ]. \"  Revisado: 5 septiembre, 2018  Versión del contenido: 12.1  © 6461-7783 Healthwise, Incorporated. Las instrucciones de cuidado fueron adaptadas bajo licencia por [employer] (which disclaims liability or warranty for this information). Si usted tiene [place] afección médica o sobre estas instrucciones, siempre pregunte a busch profesional de jory. Healthwise, Incorporated niega toda garantía o responsabilidad por busch uso de esta información.

## 2019-08-30 NOTE — PROGRESS NOTES
Return OB Visit       Subjective:   Karen Johnson 26 y.o.   SABAS: 10/21/2019, by Last Menstrual Period  GA:  32w4d. LOF: none  Vaginal bleeding: none  Fetal movement: present   Contractions: none    Allergies- reviewed:   No Known Allergies  Medications- reviewed:   Current Outpatient Medications   Medication Sig    ferrous sulfate (IRON) 325 mg (65 mg iron) EC tablet Take 1 Tab by mouth daily.  PNV No12-Iron-FA-DSS-OM-3 29 mg iron-1 mg -50 mg CPKD Take  by mouth. No current facility-administered medications for this visit.       Past Medical History- reviewed:  Past Medical History:   Diagnosis Date    Breast disorder      Past Surgical History- reviewed:   Past Surgical History:   Procedure Laterality Date    HX BREAST LUMPECTOMY Right 2013     Social History- reviewed:  Social History     Socioeconomic History    Marital status:      Spouse name: Not on file    Number of children: Not on file    Years of education: Not on file    Highest education level: Not on file   Occupational History    Not on file   Social Needs    Financial resource strain: Not on file    Food insecurity:     Worry: Not on file     Inability: Not on file    Transportation needs:     Medical: Not on file     Non-medical: Not on file   Tobacco Use    Smoking status: Never Smoker    Smokeless tobacco: Never Used   Substance and Sexual Activity    Alcohol use: Never     Frequency: Never    Drug use: Never    Sexual activity: Yes     Partners: Male   Lifestyle    Physical activity:     Days per week: Not on file     Minutes per session: Not on file    Stress: Not on file   Relationships    Social connections:     Talks on phone: Not on file     Gets together: Not on file     Attends Yazdanism service: Not on file     Active member of club or organization: Not on file     Attends meetings of clubs or organizations: Not on file     Relationship status: Not on file    Intimate partner violence: Fear of current or ex partner: Not on file     Emotionally abused: Not on file     Physically abused: Not on file     Forced sexual activity: Not on file   Other Topics Concern    Not on file   Social History Narrative    Not on file     Immunizations- reviewed:   Immunization History   Administered Date(s) Administered    Tdap 2019     Objective:     Visit Vitals  /71 (BP 1 Location: Right arm, BP Patient Position: Sitting)   Pulse 98   Temp 98.3 °F (36.8 °C) (Oral)   Resp 18   Ht 5' 1.5\" (1.562 m)   Wt 161 lb 9.6 oz (73.3 kg)   LMP 2019 (Exact Date)   SpO2 95%   BMI 30.04 kg/m²       Physical Exam:  GENERAL APPEARANCE: alert, well appearing, in no apparent distress  ABDOMEN: gravid, fundal height 31 cm, FHT present at 140 bpm  PSYCH: normal mood and affect    Labs  Recent Results (from the past 12 hour(s))   AMB POC URINALYSIS DIP STICK AUTO W/O MICRO    Collection Time: 19  2:45 PM   Result Value Ref Range    Color (UA POC) Yellow     Clarity (UA POC) Clear     Glucose (UA POC) Negative Negative    Bilirubin (UA POC) Negative Negative    Ketones (UA POC) Negative Negative    Specific gravity (UA POC) 1.010 1.001 - 1.035    Blood (UA POC) Negative Negative    pH (UA POC) 7.0 4.6 - 8.0    Protein (UA POC) Negative Negative    Urobilinogen (UA POC) 0.2 mg/dL 0.2 - 1    Nitrites (UA POC) Negative Negative    Leukocyte esterase (UA POC) 1+ Negative         Assessment         ICD-10-CM ICD-9-CM    1. 32 weeks gestation of pregnancy Z3A.32 V22.2 ADMIN INFLUENZA VIRUS VAC      INFLUENZA VIRUS VAC QUAD,SPLIT,PRESV FREE SYRINGE IM      AMB POC URINALYSIS DIP STICK AUTO W/O MICRO         Plan   26 y.o.  32w4d SABAS 10/21/2019, by Last Menstrual Period here for return OB visit     PNL: A+, HgB Fract neg, HepBsAg neg, HIV NR, Varicella/Rubella immune, G/C neg, RPR NR, CBC WNL, PAP NSIL.  Declined genetic screening, GTT WNL, s/p Tdap    · SIUP at 32wk4d  · BP WNL, UA normal today   · Declines flu shot and CBC today 2/2 cost; will readdress at next visit  · Provided information for [facility] and [address] [place] [address]   · Follow up in 2 weeks with Dr Raegan Mohr    - Anemia              * continue FeSO4 325mg daily               * CBC recommended today to monitor anemia but declined (see above)    · Other  ? Continuity Provider: Mayra  ? Pain mgmt. in labor: planning for unmedicated, was unmedicated with first two deliveries  ? Feeding:  breastfeeding  ? Social: expecting baby girl Tiki      Orders Placed This Encounter    INFLUENZA VIRUS VACCINE QUADRIVALENT, PRESERVATIVE FREE SYRINGE ([id number])    AMB POC URINALYSIS DIP STICK AUTO W/O MICRO    ADMIN INFLUENZA VIRUS VAC     Labor precautions discussed, including: Regular painful contractions, lasting for greater than one hour, taking your breath away; any vaginal bleeding; any leakage of fluid; or absent or decreased fetal movement. Call M.D. on call if any of these symptoms or signs occur. I have discussed the diagnosis with the patient and the intended plan as seen in the above orders. The patient has received an after-visit summary and questions were answered concerning future plans. I have discussed medication side effects and warnings with the patient as well. Informed pt to return to the office or go to the ER if she experiences vaginal bleeding, vaginal discharge, leaking of fluid, pelvic cramping.     Patient discussed with Dr. Matilde Crenshaw MD (attending physician)    Merrill Mcguire MD  Family Medicine Resident

## 2019-09-11 NOTE — PROGRESS NOTES
Return OB Visit       Subjective:   Jesus Gonzales 26 y.o.   SABAS: 10/21/2019, by Last Menstrual Period  GA:  34w4d. LOF: none  Vaginal bleeding: none  Fetal movement: yes, a lot! Contractions: infrequently feels abdomen tighten with mild pain usually at night, just one at a time       Allergies- reviewed:   No Known Allergies  Medications- reviewed:   Current Outpatient Medications   Medication Sig    ferrous sulfate (IRON) 325 mg (65 mg iron) EC tablet Take 1 Tab by mouth daily.  PNV No12-Iron-FA-DSS-OM-3 29 mg iron-1 mg -50 mg CPKD Take  by mouth. No current facility-administered medications for this visit.       Past Medical History- reviewed:  Past Medical History:   Diagnosis Date    Breast disorder      Past Surgical History- reviewed:   Past Surgical History:   Procedure Laterality Date    HX BREAST LUMPECTOMY Right 2013     Social History- reviewed:  Social History     Socioeconomic History    Marital status:      Spouse name: Not on file    Number of children: Not on file    Years of education: Not on file    Highest education level: Not on file   Occupational History    Not on file   Social Needs    Financial resource strain: Not on file    Food insecurity:     Worry: Not on file     Inability: Not on file    Transportation needs:     Medical: Not on file     Non-medical: Not on file   Tobacco Use    Smoking status: Never Smoker    Smokeless tobacco: Never Used   Substance and Sexual Activity    Alcohol use: Never     Frequency: Never    Drug use: Never    Sexual activity: Yes     Partners: Male   Lifestyle    Physical activity:     Days per week: Not on file     Minutes per session: Not on file    Stress: Not on file   Relationships    Social connections:     Talks on phone: Not on file     Gets together: Not on file     Attends Taoist service: Not on file     Active member of club or organization: Not on file     Attends meetings of clubs or organizations: Not on file     Relationship status: Not on file    Intimate partner violence:     Fear of current or ex partner: Not on file     Emotionally abused: Not on file     Physically abused: Not on file     Forced sexual activity: Not on file   Other Topics Concern    Not on file   Social History Narrative    Not on file     Immunizations- reviewed:   Immunization History   Administered Date(s) Administered    Influenza Vaccine (Quad) PF 2019    Tdap 2019         Objective:     Visit Vitals  BP 93/57   Pulse 97   Temp 97.1 °F (36.2 °C) (Oral)   Resp 16   Ht 5' 1.5\" (1.562 m)   Wt 166 lb (75.3 kg)   LMP 2019 (Exact Date)   SpO2 99%   BMI 30.86 kg/m²       Physical Exam:  GENERAL APPEARANCE: alert, well appearing, in no apparent distress  ABDOMEN: gravid, fundal height 33cm, FHT present at 135 bpm  PSYCH: normal mood and affect    Labs  Recent Results (from the past 12 hour(s))   AMB POC URINALYSIS DIP STICK AUTO W/O MICRO    Collection Time: 19  2:26 PM   Result Value Ref Range    Color (UA POC) Yellow     Clarity (UA POC) Clear     Glucose (UA POC) Negative Negative    Bilirubin (UA POC) Negative Negative    Ketones (UA POC) Negative Negative    Specific gravity (UA POC) 1.010 1.001 - 1.035    Blood (UA POC) Negative Negative    pH (UA POC) 7.0 4.6 - 8.0    Protein (UA POC) Negative Negative    Urobilinogen (UA POC) 0.2 mg/dL 0.2 - 1    Nitrites (UA POC) Negative Negative    Leukocyte esterase (UA POC) 1+ Negative         Assessment         ICD-10-CM ICD-9-CM    1. Encounter for supervision of normal first pregnancy in third trimester Z34.03 V22.0 AMB POC URINALYSIS DIP STICK AUTO W/O MICRO      INFLUENZA VIRUS VAC QUAD,SPLIT,PRESV FREE SYRINGE IM      FL IMMUNIZ ADMIN,1 SINGLE/COMB VAC/TOXOID   2.  Anemia during pregnancy in third trimester O99.013 648.23 CBC W/O DIFF         Plan   26 y.o.  34w4d SABAS 10/21/2019, by Last Menstrual Period here for return OB visit     PNL: A+, HgB Fract neg, HepBsAg neg, HIV NR, Varicella/Rubella immune, G/C neg, RPR NR, CBC WNL, PAP NSIL. Declined genetic screening, GTT WNL, s/p Tdap     · SIUP at 34wk2d  ? BP WNL, UA normal today  ? Influenza today  ? [facility] and [address] [place] [address] 2/2 concerns about medical bills  ? Follow up in 2 weeks with Dr Sammuel Sacks     - Anemia              * continue FeSO4 325mg daily               * CBC today     · Other  ? Continuity Provider: Mayra  ? Pain mgmt. in labor: planning for unmedicated, was unmedicated with first two deliveries  ? Feeding:  breastfeeding  ? Social: expecting baby girl Tiki      Orders Placed This Encounter    VA IMMUNIZ ADMIN,1 SINGLE/COMB VAC/TOXOID    INFLUENZA VIRUS VACCINE QUADRIVALENT, PRESERVATIVE FREE SYRINGE (91243)    CBC W/O DIFF    AMB POC URINALYSIS DIP STICK AUTO W/O MICRO     Labor precautions discussed, including: Regular painful contractions, lasting for greater than one hour, taking your breath away; any vaginal bleeding; any leakage of fluid; or absent or decreased fetal movement. Call M.D. on call if any of these symptoms or signs occur. I have discussed the diagnosis with the patient and the intended plan as seen in the above orders. The patient has received an after-visit summary and questions were answered concerning future plans. I have discussed medication side effects and warnings with the patient as well. Informed pt to return to the office or go to the ER if she experiences vaginal bleeding, vaginal discharge, leaking of fluid, pelvic cramping.     Patient discussed with Dr. Royce Chavira MD (attending physician)    Adriana Schmidt MD  Family Medicine Resident

## 2019-09-13 ENCOUNTER — ROUTINE PRENATAL (OUTPATIENT)
Dept: FAMILY MEDICINE CLINIC | Age: 29
End: 2019-09-13

## 2019-09-13 VITALS
WEIGHT: 166 LBS | SYSTOLIC BLOOD PRESSURE: 93 MMHG | DIASTOLIC BLOOD PRESSURE: 57 MMHG | HEIGHT: 62 IN | OXYGEN SATURATION: 99 % | HEART RATE: 97 BPM | BODY MASS INDEX: 30.55 KG/M2 | RESPIRATION RATE: 16 BRPM | TEMPERATURE: 97.1 F

## 2019-09-13 DIAGNOSIS — O99.013 ANEMIA DURING PREGNANCY IN THIRD TRIMESTER: ICD-10-CM

## 2019-09-13 DIAGNOSIS — Z34.03 ENCOUNTER FOR SUPERVISION OF NORMAL FIRST PREGNANCY IN THIRD TRIMESTER: Primary | ICD-10-CM

## 2019-09-13 NOTE — PROGRESS NOTES
25 yo     34 w 4 d    + BH contractions    93/57    + fetal movement    Influenza vaccine today     x 2  Iron deficiency anemia    I reviewed with the resident the medical history and the resident's findings on the physical examination. I discussed with the resident the patient's diagnosis and concur with the plan.

## 2019-09-13 NOTE — PATIENT INSTRUCTIONS
Monalisa ooks a busch médico yola el embarazo (después de 20 semanas) - [ Learning About When to Call Your Doctor During Pregnancy (After 20 Weeks) ]  Instrucciones de cuidado  Es normal que tenga inquietudes acerca de lo que podría ser un problema yola el [facility]. Aunque la mayoría de las mujeres embarazadas no tienen ningún problema grave, es importante saber cuándo llamar a busch médico si tiene determinados síntomas o señales de trabajo de [place]. Estas son algunas sugerencias generales. Busch médico puede darle más información sobre cuándo llamar. Cuándo llamar a busch médico (después de 20 semanas)  Llame  en cualquier momento que considere que necesita atención de [place]. Por ejemplo, llame si:  · Tiene sangrado vaginal intenso. · Tiene dolor repentino e intenso en el abdomen. · Se desmayó (perdió el conocimiento). · Tiene raimundo convulsión. · Ve o siente el cordón umbilical.  · Suellen que está a punto de chrissy a gaurav a busch bebé y no puede llegar en forma crow al hospital.  Emy Gutierrez a busch médico ahora mismo o busque atención médica inmediata si:  · Tiene sangrado vaginal.  · Tiene dolor en el abdomen. · Tiene fiebre. · Tiene síntomas de preeclampsia, christel:  ? Hinchazón repentina de la delma, las mani o los pies. ? Nuevos problemas de visión (christel oscurecimiento, angel borroso o angel puntos). ? Dolor de messi intenso. · Tiene raimundo pérdida repentina de líquido por la vagina. (Piensa que rompió la martha). · Piensa que puede yohannes comenzado el [place] de [place]. [place] significa que ha tenido al menos 6 contracciones en [employer]. · Nota que busch bebé ha dejado de moverse o lo hace mucho menos de lo habitual.  · Tiene síntomas de raimundo infección urinaria. Estos pueden incluir:  ? Dolor o ardor al orinar. ? Necesidad de orinar con frecuencia sin poder eliminar mucha orina. ? Dolor en el flanco, que se encuentra zenaida debajo de la caja torácica y [place] de la cintura en un lado de la espalda.   ? Negro en la [facility]. Preste especial atención a los cambios en busch jory y asegúrese de comunicarse con busch médico si:  · Tiene flujo vaginal con un olor desagradable. · Tiene cambios en la piel, tales christel:  ? Salpullido. ? Comezón. ? Color amarillento en la piel. · Tiene otras inquietudes acerca de busch embarazo. Si tiene signos de trabajo de parto al llegar a las [address] o más  Si tiene señales de [place] de parto a las 37 semanas o [employer], es posible que busch médico le diga que llame cuando busch trabajo de parto se vuelva más [place]. Los síntomas del trabajo de parto activo incluyen:  · Contracciones que son regulares. · Contracciones a intervalos de menos de 5 minutos. · Contracciones yola las cuales es difícil hablar. La atención de seguimiento es raimundo parte clave de busch tratamiento y seguridad. Asegúrese de hacer y acudir a todas las citas, y llame a busch médico si está teniendo problemas. También es raimundo buena idea saber los resultados de kolby exámenes y mantener raimundo lista de los medicamentos que milan. ¿Dónde puede encontrar más información en inglés? [address] a http://bill-abhishek.info/. Escriba H383 en la búsqueda para aprender más acerca de \"Aprenda cuándo llamar a busch médico yola el embarazo (después de 20 semanas) - [ Learning About When to Call Your Doctor During Pregnancy (After 20 Weeks) ]. \"  Revisado: 5 septiembre, 2018  Versión del contenido: 12.1  © 0137-8164 Healthwise, [employer]. Las instrucciones de cuidado fueron adaptadas bajo licencia por [employer] (which disclaims liability or warranty for this information). Si usted tiene Dickson Colo afección médica o sobre estas instrucciones, siempre pregunte a busch profesional de jory. [facility] [place] niega toda garantía o responsabilidad por busch uso de esta información. Semanas 34 a 36 de busch embarazo:  Instrucciones de cuidado - [ Rekha Parr 34 to 39 of Your Pregnancy: Care Instructions ]  Instrucciones de cuidado    A estas [place], busch bebé y busch abdomen habrán crecido considerablemente. Ben es Sumit de chrissy a gaurav. Los pulmones de busch bebé están ben listos para respirar aire. Los huesos de la messi de busch bebé ahora son bastante firmes christel para protegerla leah se mantienen lo suficientemente blandos christel para atravesar el canal de [place]. Es posible que sienta entusiasmo, marco antonio, ansiedad o miedo. Quizá se pregunte cómo se dará cuenta de si está en trabajo de parto o qué esperar en ange momento. Trate de ser flexible con kolby expectativas respecto del nacimiento. Dado que cada nacimiento es diferente, no hay manera de saber exactamente cómo será busch parto. Esta hoja de cuidados la ayudará a saber qué esperar y cómo prepararse. Le podría facilitar el parto. Si todavía no le sommer aplicado la vacuna Tdap (tétanos, difteria y tos [place]) yola tanya [place], hable con busch médico acerca de aplicársela. Yannick Mike a proteger a busch recién nacido contra la infección por tos ferina. En la semana 36, a la mayoría de las mujeres se les hace raimundo prueba de estreptococos del butch B (GBS, por kolby siglas en inglés). Los estreptococos del butch B son bacterias comunes que pueden vivir en la vagina y el recto. Pueden hacer que busch bebé se enferme después del parto. Si el resultado es positivo, usted recibirá antibióticos yola el trabajo de [place]. Los medicamentos evitarán que busch bebé contraiga las bacterias. La atención de seguimiento es raimundo parte clave de busch tratamiento y seguridad. Asegúrese de hacer y acudir a todas las citas, y llame a busch médico si está teniendo problemas. También es raimundo buena idea saber los resultados de kolby exámenes y mantener raimundo lista de los medicamentos que milan. ¿Cómo puede cuidarse en el hogar? Aprenda sobre las alternativas para aliviar el dolor  · El dolor se manifiesta de modo diferente en cada solange. Hable con busch médico acerca de kolby sentimientos sobre el dolor.   · [address] formas de aliviar el dolor. Estas incluyen medicamentos o técnicas de respiración, así christel medidas para estar cómoda. Usted puede utilizar más de Deboraha Xander opción. · Si elige un analgésico (medicamento para el dolor) yola el trabajo de [place], hable con busch médico acerca de kolby opciones. Algunos medicamentos reducen la ansiedad y [place] [facility] a aliviar parte del dolor. Otros adormecen la parte inferior del cuerpo para que no sienta dolor. · Asegúrese de decirle a busch médico acerca de busch elección de analgésico antes de empezar el trabajo de parto o muy temprano en el [place] de [place]. Es posible que pueda cambiar de parecer a medida que avanza el [place] de [place]. · Siri vez se duerme a raimundo solange con medicamentos administrados a través de raimundo máscara o por vía intravenosa (IV). Trabajo de parto y [place]  · La primera etapa del [place] de parto se divide en toñito fases: Marlynn Prophet y de transición. ? La mayoría de las mujeres experimentan la fase latente del [place] de parto en kolby hogares. Usted puede [employer] o descansar, comer refrigerios livianos, beber líquidos jaycee y comenzar a contar las contracciones. ? Cuando advierta que se le vuelve difícil hablar yola raimundo contracción, es posible que esté por pasar a la fase activa. Yola la fase Garnell Falling, debería ir al hospital si no está allí aún. ? Usted está en la fase activa cuando tiene contracciones cada 3 o 4 minutos y wiseman alrededor de 60 segundos. El nataliia uterino comienza a abrirse con más rapidez.  ? Si se le rompe la martha, las contracciones serán más intensas y más frecuentes. ? Yola la fase de transición, el nataliia uterino se estira y las contracciones se producen con Marsha Perish. ? Cande Galindo tenga deseos de pujar, sin embargo es posible que el nataliia uterino aún no esté preparado. El médico le dirá cuándo pujar. · La segunda etapa comienza cuando el nataliia uterino se abre por completo y usted está lista para pujar.   ? Las contracciones son muy intensas a fin de empujar al bebé por el canal de parto. ? Sentirá la necesidad de pujar. Podría sentir christel si tuviera ganas de evacuar el intestino. ? Yohannes Bracket entrenen a [employer]. Estas contracciones serán muy intensas leah no ocurrirán con tanta frecuencia. Puede descansar un poco entre contracciones. ? Es posible que esté sensible e irritable. Es posible que no se dé cuenta de lo que pasa a busch alrededor. ? Un último esfuerzo y habrá nacido busch bebé. · La tercera etapa ocurre cuando con unas cuantas contracciones más se expulsa la placenta. [place] puede durar 30 minutos o menos. · La cuarta etapa es la de recuperación. Es posible que se sienta abrumada con las emociones y exhausta leah alerta. [place] es un buen momento para comenzar el amamantamiento. ¿Dónde puede encontrar más información en inglés? Smiley Delvalle a http://bill-abhishek.info/. Merle Burnser F820 en la búsqueda para aprender más acerca de \"Semanas 34 a 39 de busch embarazo: Instrucciones de cuidado - [ Meg Led 34 to 39 of Your Pregnancy: Care Instructions ]. \"  Revisado: 5 septiembre, 2018  Versión del contenido: 12.1  © 0388-2039 Healthwise, Incorporated. Las instrucciones de cuidado fueron adaptadas bajo licencia por [employer] (which disclaims liability or warranty for this information). Si usted tiene [place] afección médica o sobre estas instrucciones, siempre pregunte a busch profesional de jory. Healthwise, Incorporated niega toda garantía o responsabilidad por busch uso de esta información.

## 2019-09-13 NOTE — PROGRESS NOTES
Chief Complaint   Patient presents with    Routine Prenatal Visit     .  34w 4d today. No bleeding or LOF.  +FM. 1. Have you been to the ER, urgent care clinic since your last visit? Hospitalized since your last visit? No    2. Have you seen or consulted any other health care providers outside of the [address] [place] [address] since your last visit? Include any pap smears or colon screening.  No

## 2019-09-14 LAB
ERYTHROCYTE [DISTWIDTH] IN BLOOD BY AUTOMATED COUNT: 16.6 % (ref 12.3–15.4)
HCT VFR BLD AUTO: 28.9 % (ref 34–46.6)
HGB BLD-MCNC: 9 G/DL (ref 11.1–15.9)
MCH RBC QN AUTO: 22.9 PG (ref 26.6–33)
MCHC RBC AUTO-ENTMCNC: 31.1 G/DL (ref 31.5–35.7)
MCV RBC AUTO: 74 FL (ref 79–97)
PLATELET # BLD AUTO: 306 X10E3/UL (ref 150–450)
RBC # BLD AUTO: 3.93 X10E6/UL (ref 3.77–5.28)
WBC # BLD AUTO: 8.3 X10E3/UL (ref 3.4–10.8)

## 2019-09-25 ENCOUNTER — TELEPHONE (OUTPATIENT)
Dept: FAMILY MEDICINE CLINIC | Age: 29
End: 2019-09-25

## 2019-09-25 NOTE — TELEPHONE ENCOUNTER
Patient niece/Cassblaze Burden on hippa calling,      Notes patient is pregnant and having pain since last night below belly,vaginal area and legs hurt.  Called for nurse triage and nurse Chasidy Simmons assisted

## 2019-09-25 NOTE — TELEPHONE ENCOUNTER
Spoke with patients niece for triage. Patient is a  at 36 weeks 2 days. She stated patient has had some lower abdominal pain, pelvic pain and pain in her upper legs since last night. She took one 500mg tylenol last night and states that helped with the pain. She is feeling the baby move, and denies any other symptoms. Advised that the patient can take more tylenol for pain, two extra strength tylenols at a time and up to 4,000mg per day. Patient's niece verbalized understanding.

## 2019-09-29 NOTE — PROGRESS NOTES
Return OB Visit       Subjective:   Bryant Spencer 26 y.o.   SABAS: 10/21/2019, by Last Menstrual Period  GA:  37w0d. LOF: none  Vaginal bleeding: none  Fetal movement: yes  Contractions: none    No longer having the same abdominal and pelvic pain that she was having last week. Resolved with Tylenol and hydration. Allergies- reviewed:   No Known Allergies  Medications- reviewed:   Current Outpatient Medications   Medication Sig    ferrous sulfate (IRON) 325 mg (65 mg iron) EC tablet Take 1 Tab by mouth daily.  PNV No12-Iron-FA-DSS-OM-3 29 mg iron-1 mg -50 mg CPKD Take  by mouth. No current facility-administered medications for this visit.       Past Medical History- reviewed:  Past Medical History:   Diagnosis Date    Breast disorder      Past Surgical History- reviewed:   Past Surgical History:   Procedure Laterality Date    HX BREAST LUMPECTOMY Right 2013     Social History- reviewed:  Social History     Socioeconomic History    Marital status:      Spouse name: Not on file    Number of children: Not on file    Years of education: Not on file    Highest education level: Not on file   Occupational History    Not on file   Social Needs    Financial resource strain: Not on file    Food insecurity:     Worry: Not on file     Inability: Not on file    Transportation needs:     Medical: Not on file     Non-medical: Not on file   Tobacco Use    Smoking status: Never Smoker    Smokeless tobacco: Never Used   Substance and Sexual Activity    Alcohol use: Never     Frequency: Never    Drug use: Never    Sexual activity: Yes     Partners: Male   Lifestyle    Physical activity:     Days per week: Not on file     Minutes per session: Not on file    Stress: Not on file   Relationships    Social connections:     Talks on phone: Not on file     Gets together: Not on file     Attends [employer]ian service: Not on file     Active member of club or organization: Not on file     Attends meetings of clubs or organizations: Not on file     Relationship status: Not on file    Intimate partner violence:     Fear of current or ex partner: Not on file     Emotionally abused: Not on file     Physically abused: Not on file     Forced sexual activity: Not on file   Other Topics Concern    Not on file   Social History Narrative    Not on file     Immunizations- reviewed:   Immunization History   Administered Date(s) Administered    Influenza Vaccine (Quad) PF 2019    Tdap 2019       Objective:     Visit Vitals  /53   Pulse 92   Temp 97.9 °F (36.6 °C) (Oral)   Resp 16   Ht 5' 1.5\" (1.562 m)   Wt 168 lb (76.2 kg)   LMP 2019 (Exact Date)   SpO2 98%   BMI 31.23 kg/m²       Physical Exam:  GENERAL APPEARANCE: alert, well appearing, in no apparent distress  ABDOMEN: gravid, fundal height 36cm, FHT present at 145 bpm  PSYCH: normal mood and affect    Labs  Recent Results (from the past 12 hour(s))   AMB POC URINALYSIS DIP STICK AUTO W/O MICRO    Collection Time: 19  9:39 AM   Result Value Ref Range    Color (UA POC) Yellow     Clarity (UA POC) Slightly Cloudy     Glucose (UA POC) Negative Negative    Bilirubin (UA POC) Negative Negative    Ketones (UA POC) Negative Negative    Specific gravity (UA POC) 1.015 1.001 - 1.035    Blood (UA POC) Trace Negative    pH (UA POC) 7.0 4.6 - 8.0    Protein (UA POC) Negative Negative    Urobilinogen (UA POC) 1 mg/dL 0.2 - 1    Nitrites (UA POC) Negative Negative    Leukocyte esterase (UA POC) 2+ Negative         Assessment         ICD-10-CM ICD-9-CM    1. Prenatal care in third trimester Z34.93 V22.1 CULTURE, GENITAL GROUP B STREP      AMB POC URINALYSIS DIP STICK AUTO W/O MICRO      CULTURE, URINE         Plan   26 y.o.  36w6d SABAS 10/21/2019, by Last Menstrual Period here for return OB visit     PNL: A+, HgB Fract neg, HepBsAg neg, HIV NR, Varicella/Rubella immune, G/C neg, RPR NR, CBC WNL, PAP NSIL.  Declined genetic screening, GTT WNL, s/p Tdap and flu vaccine     · SIUP at 37wk0d  ? BP WNL, UA normal today however has 2+ LE with occasional dysuria; UCx sent today  ? GBS today  ? US for position at next visit  ? Follow up in 1 weeks with Dr Jerman Brewster     - Anemia: last HgB 9.0 unchanged from previous              * Increase FeSO4 325mg BID              * Repeat CBC in 2 weeks     · Other  ? Continuity Provider: Mayra  ? Pain mgmt. in labor: planning for unmedicated, was unmedicated with first two deliveries  ? Feeding:  breastfeeding  ? Social: expecting baby girl Marletta Route, concerns about ability to pay medical bills      Orders Placed This Encounter    CULTURE, GENITAL GROUP B STREP    CULTURE, URINE    AMB POC URINALYSIS DIP STICK AUTO W/O MICRO     Labor precautions discussed, including: Regular painful contractions, lasting for greater than one hour, taking your breath away; any vaginal bleeding; any leakage of fluid; or absent or decreased fetal movement. Call M.D. on call if any of these symptoms or signs occur. I have discussed the diagnosis with the patient and the intended plan as seen in the above orders. The patient has received an after-visit summary and questions were answered concerning future plans. I have discussed medication side effects and warnings with the patient as well. Informed pt to return to the office or go to the ER if she experiences vaginal bleeding, vaginal discharge, leaking of fluid, pelvic cramping.     Patient discussed with Dr. Tr Galan MD (attending physician)    Balbina Varela MD  Family Medicine Resident

## 2019-09-30 ENCOUNTER — ROUTINE PRENATAL (OUTPATIENT)
Dept: FAMILY MEDICINE CLINIC | Age: 29
End: 2019-09-30

## 2019-09-30 VITALS
TEMPERATURE: 97.9 F | WEIGHT: 168 LBS | HEART RATE: 92 BPM | OXYGEN SATURATION: 98 % | RESPIRATION RATE: 16 BRPM | DIASTOLIC BLOOD PRESSURE: 53 MMHG | BODY MASS INDEX: 30.91 KG/M2 | HEIGHT: 62 IN | SYSTOLIC BLOOD PRESSURE: 103 MMHG

## 2019-09-30 DIAGNOSIS — Z34.93 PRENATAL CARE IN THIRD TRIMESTER: Primary | ICD-10-CM

## 2019-09-30 LAB
BILIRUB UR QL STRIP: NEGATIVE
GLUCOSE UR-MCNC: NEGATIVE MG/DL
GRBS, EXTERNAL: NEGATIVE
KETONES P FAST UR STRIP-MCNC: NEGATIVE MG/DL
PH UR STRIP: 7 [PH] (ref 4.6–8)
PROT UR QL STRIP: NEGATIVE
SP GR UR STRIP: 1.01 (ref 1–1.03)
UA UROBILINOGEN AMB POC: NORMAL (ref 0.2–1)
URINALYSIS CLARITY POC: NORMAL
URINALYSIS COLOR POC: YELLOW
URINE BLOOD POC: NORMAL
URINE LEUKOCYTES POC: NORMAL
URINE NITRITES POC: NEGATIVE

## 2019-09-30 NOTE — PATIENT INSTRUCTIONS
Aprenda cuándo llamar a busch médico yola el embarazo (después de 20 semanas) - [ Learning About When to Call Your Doctor During Pregnancy (After 20 Weeks) ] Instrucciones de cuidado Es normal que tenga inquietudes acerca de lo que podría ser un problema yola el [facility]. Aunque la mayoría de las mujeres embarazadas no tienen ningún problema grave, es importante saber cuándo llamar a busch médico si tiene determinados síntomas o señales de trabajo de [place]. Estas son algunas sugerencias generales. Busch médico puede darle más información sobre cuándo llamar. Cuándo llamar a busch médico (después de 20 semanas) Llame al 911 en cualquier momento que considere que necesita atención de [place]. Por ejemplo, llame si: · Tiene sangrado vaginal intenso. · Tiene dolor repentino e intenso en el abdomen. · Se desmayó (perdió el conocimiento). · Tiene raimundo convulsión. · Ve o siente el cordón umbilical. 
· Suellen que está a punto de chrissy a gaurav a busch bebé y no puede llegar en forma crow al hospital. 
Karlee Dach a busch médico ahora mismo o busque atención médica inmediata si: · Tiene sangrado vaginal. 
· Tiene dolor en el abdomen. · Tiene fiebre. · Tiene síntomas de preeclampsia, christel: 
? Hinchazón repentina de la delma, las mani o los pies. ? Nuevos problemas de visión (christel oscurecimiento, angle borroso o angel puntos). ? Dolor de messi intenso. · Tiene raimundo pérdida repentina de líquido por la vagina. (Piensa que rompió la martha). · Piensa que puede yohannes comenzado el [place]. [place] significa que ha tenido al menos 6 contracciones en [employer]. · Nota que busch bebé ha dejado de moverse o lo hace mucho menos de lo habitual. 
· Tiene síntomas de raimundo infección urinaria. Estos pueden incluir: ? Dolor o ardor al orinar. ? Necesidad de orinar con frecuencia sin poder eliminar mucha orina. ? Dolor en el flanco, que se encuentra zenaida debajo de la caja torácica y [place] de la cintura en un lado de la espalda. ? [employer]. Preste especial atención a los cambios en busch jory y asegúrese de comunicarse con busch médico si: · Tiene flujo vaginal con un olor desagradable. · Tiene cambios en la piel, tales christel: 
? Salpullido. ? Comezón. ? Color amarillento en la piel. · Tiene otras inquietudes acerca de busch embarazo. Si tiene signos de trabajo de parto al llegar a las [address] Si tiene señales de [place] a las [address] [facility] o [facility], es posible que busch médico le diga que llame cuando busch trabajo de parto se vuelva [place] [facility]. Los síntomas del trabajo de parto activo incluyen: · Contracciones que son regulares. · Contracciones a intervalos de menos de 5 minutos. · Contracciones yola las cuales es difícil hablar. La atención de seguimiento es raimundo parte clave de busch tratamiento y seguridad. Asegúrese de hacer y acudir a todas las citas, y llame a busch médico si está teniendo problemas. También es raimundo buena idea saber los resultados de kolby exámenes y mantener raimundo lista de los medicamentos que milan. Dónde puede encontrar más información en inglés? Tea Castro a http://bill-abhishek.info/. Escriba D057 en la búsqueda para aprender más acerca de \"Aprenda cuándo llamar a busch médico yola el embarazo (después de 20 semanas) - [ Learning About When to Call Your Doctor During Pregnancy (After 20 Weeks) ]. \" 
Revisado: [address] [place], 2019 Versión del contenido: 12.2 © 9261-3203 [employer]. Las instrucciones de cuidado fueron adaptadas bajo licencia por [employer] (which disclaims liability or warranty for this information). Si usted tiene Marissa Walker afección médica o sobre estas instrucciones, siempre pregunte a busch profesional de jory. [facility] niega toda garantía o responsabilidad por busch uso de esta información. Semana 40 de busch embarazo: Instrucciones de cuidado - [ Week 40 of Your Pregnancy: Care Instructions ] Instrucciones de cuidado Usted está cerca del final de busch embarazo, y probablemente esté bastante incómoda. Puede ser más difícil caminar. Acostarse probablemente tampoco sea cómodo. Podría tener dificultad para dormir o para permanecer dormida. La mayoría de las mujeres katarina a gaurav entre las 40 y 43 semanas. [place] es un buen momento para pensar en preparar un maletín para el hospital con los artículos que necesitará. Entonces estará lista para cuando comience el [place]. La atención de seguimiento es raimundo parte clave de busch tratamiento y seguridad. Asegúrese de hacer y acudir a todas las citas, y llame a busch médico si está teniendo problemas. También es raimundo buena idea saber los resultados de kolby exámenes y mantener raimundo lista de los medicamentos que milan. Cómo puede cuidarse en el hogar? Aprenda sobre el amamantamiento · El amamantamiento es lo mejor para busch bebé y Jayshree Blow es eisenberg para usted. · La leche materna tiene anticuerpos que ayudan al bebé a combatir las infecciones. · Las madres que amamantan suelen bajar de peso más rápidamente, debido a que elaborar leche quema calorías. · Informarse acerca de las mejores maneras de sostener a busch bebé le facilitará el amamantamiento. · Deje que busch devaughn bañe y [employer] pañales del bebé para que no se sienta excluida. Acurrúquense juntos cuando amamante a busch bebé. · Es posible que desee aprender a usar un sacaleches y guardar busch [place]. · Si elige alimentar a busch bebé con biberón, hágalo de la [employer] resulte más parecida al amamantamiento para que pueda establecer un vínculo con busch bebé. Siempre sostenga al bebé y el biberón. No apoye el biberón ni deje que busch bebé se quede dormido con él. Albesa Divers · Es normal que los bebés lloren de 1 a 3 horas al día. Algunos lloran más, otros menos. · Los bebés no lloran para causarle molestias ni porque usted sea [facility]. · Llorar es la forma de comunicarse que tiene el bebé.  Busch bebé Maicol Romano hambre o gases, necesitar un cambio de pañal, sentir frío o calor, sentirse solo o tenso. A veces, los bebés lloran por motivos desconocidos. · Si usted responde a las necesidades de busch bebé, tanya aprenderá a confiar en usted. · Intente mantener la calma cuando busch bebé llore. Busch bebé se puede sentir más molesto si siente que usted [place]. Sepa cómo cuidar a busch recién nacido · El muñón del cordón umbilical de busch bebé se caerá solo, por lo general entre las semanas 1 y 2. Para cuidar la alyse del cordón umbilical de busch bebé: ? Limpie la alyse de la parte inferior del cordón umbilical 2 o 3 veces al día. ? [place]ong[place] especial atención en la alyse en donde el cordón se fija a la piel. ? Mantenga el pañal doblado debajo del cordón. ? Utilice raimundo toallita húmeda o algodón para darle un baño de esponja a busch bebé hasta que se le haya caído el muñón. · La primera evacuación intestinal oscura de busch bebé se conoce christel meconio. Después del meconio, el bebé tendrá kolby propios hábitos de evacuación intestinal. 
? Algunos bebés, especialmente aquellos que se alimentan con [address], tienen varias evacuaciones al día. Otros tienen [employer] al día, o raimundo cada 2 o 3 días. ? Los bebés que son amamantados a menudo tienen evacuaciones sueltas amarillentas. Los bebés que se alimentan con leche de fórmula evacuan heces más sólidas. ? Si busch bebé tiene evacuaciones christel bolitas pequeñas, está estreñido. Después de 2 kori de estreñimiento, llame al médico de busch bebé. · Si busch bebé va a ser Alfrde Goltz, usted puede cuidarlo en el hogar. ? Enjuáguele delicadamente el pene con agua tibia cada vez que le cambie los pañales. No intente retirar la membrana que se forma sobre el pene. Esta membrana desaparecerá por sí sher. Séquele la alyse con toques suaves de toalla. ? Coloque raimundo pomada a base de petróleo, christel vaselina, en la alyse del pañal que tendrá contacto con el pene de busch bebé.  [place] evitará que el pañal se le pegue al bebé. ? Pregúntele al médico acerca de darle acetaminofén (Tylenol) a busch bebé para el dolor. Dónde puede encontrar más información en inglés? Homero Nguyen a http://bill-abhishek.info/. Escriba N756 en la búsqueda para aprender más acerca de \"Semana 40 de busch embarazo: Instrucciones de cuidado - [ Week 40 of Your Pregnancy: Care Instructions ]. \" 
Revisado: 29 reyna, 2019 Versión del contenido: 12.2 © 1384-1933 [employer]wise, Incorporated. Las instrucciones de cuidado fueron adaptadas bajo licencia por [employer] (which disclaims liability or warranty for this information). Si usted tiene Bunola Gilbert afección médica o sobre estas instrucciones, siempre pregunte a busch profesional de jory. Healthwise, Incorporated niega toda garantía o responsabilidad por busch uso de esta información.

## 2019-09-30 NOTE — PROGRESS NOTES
Chief Complaint   Patient presents with    Routine Prenatal Visit     .  37w 0d today. No bleeding or LOF.  +FM. 1. Have you been to the ER, urgent care clinic since your last visit? Hospitalized since your last visit? No    2. Have you seen or consulted any other health care providers outside of the [address] [place] [address] since your last visit? Include any pap smears or colon screening.  No

## 2019-10-02 LAB — BACTERIA UR CULT: NORMAL

## 2019-10-04 LAB — B-HEM STREP SPEC QL CULT: NEGATIVE

## 2019-10-06 NOTE — PROGRESS NOTES
Return OB Visit       Subjective:   Amari Hdez 26 y.o.   SABAS: 10/21/2019, by Last Menstrual Period  GA:  38w0d. LOF: none  Vaginal bleeding: none  Fetal movement: yes   Contractions: none    Denies dysuria, HA today. Allergies- reviewed:   No Known Allergies  Medications- reviewed:   Current Outpatient Medications   Medication Sig    ferrous sulfate (IRON) 325 mg (65 mg iron) EC tablet Take 1 Tab by mouth daily.  PNV No12-Iron-FA-DSS-OM-3 29 mg iron-1 mg -50 mg CPKD Take  by mouth. No current facility-administered medications for this visit.       Past Medical History- reviewed:  Past Medical History:   Diagnosis Date    Breast disorder      Past Surgical History- reviewed:   Past Surgical History:   Procedure Laterality Date    HX BREAST LUMPECTOMY Right 2013     Social History- reviewed:  Social History     Socioeconomic History    Marital status:      Spouse name: Not on file    Number of children: Not on file    Years of education: Not on file    Highest education level: Not on file   Occupational History    Not on file   Social Needs    Financial resource strain: Not on file    Food insecurity:     Worry: Not on file     Inability: Not on file    Transportation needs:     Medical: Not on file     Non-medical: Not on file   Tobacco Use    Smoking status: Never Smoker    Smokeless tobacco: Never Used   Substance and Sexual Activity    Alcohol use: Never     Frequency: Never    Drug use: Never    Sexual activity: Yes     Partners: Male   Lifestyle    Physical activity:     Days per week: Not on file     Minutes per session: Not on file    Stress: Not on file   Relationships    Social connections:     Talks on phone: Not on file     Gets together: Not on file     Attends Taoism service: Not on file     Active member of club or organization: Not on file     Attends meetings of clubs or organizations: Not on file     Relationship status: Not on file   [facility] Intimate partner violence:     Fear of current or ex partner: Not on file     Emotionally abused: Not on file     Physically abused: Not on file     Forced sexual activity: Not on file   Other Topics Concern    Not on file   Social History Narrative    Not on file     Immunizations- reviewed:   Immunization History   Administered Date(s) Administered    Influenza Vaccine (Quad) PF 2019    Tdap 2019       Objective:     Visit Vitals  BP (P) 96/61   Pulse (P) 96   Temp (P) 97.8 °F (36.6 °C) (Axillary)   Resp 16   Ht 5' 1.5\" (1.562 m)   Wt 168 lb (76.2 kg)   LMP 2019 (Exact Date)   BMI 31.23 kg/m²       Physical Exam:  GENERAL APPEARANCE: alert, well appearing, in no apparent distress  ABDOMEN: gravid, fundal height 37cm, FHT present at 140 bpm  PSYCH: normal mood and affect    Labs  Recent Results (from the past 12 hour(s))   AMB POC URINALYSIS DIP STICK AUTO W/O MICRO    Collection Time: 10/07/19  1:57 PM   Result Value Ref Range    Color (UA POC) Yellow     Clarity (UA POC) Clear     Glucose (UA POC) Negative Negative    Bilirubin (UA POC) Negative Negative    Ketones (UA POC) Negative Negative    Specific gravity (UA POC) 1.010 1.001 - 1.035    Blood (UA POC) Negative Negative    pH (UA POC) 7.0 4.6 - 8.0    Protein (UA POC) Negative Negative    Urobilinogen (UA POC) 0.2 mg/dL 0.2 - 1    Nitrites (UA POC) Negative Negative    Leukocyte esterase (UA POC) 2+ Negative         Assessment         ICD-10-CM ICD-9-CM    1. Prenatal care in third trimester Z34.93 V22.1 AMB POC URINALYSIS DIP STICK AUTO W/O MICRO         Plan   26 y.o.  38w0d SABAS 10/21/2019, by Last Menstrual Period here for return OB visit        PNL: A+, HgB Fract neg, HepBsAg neg, HIV NR, Varicella/Rubella immune, G/C neg, RPR NR, CBC WNL, PAP NSIL. Declined genetic screening, GTT WNL, s/p Tdap and flu vaccine. GBS negative     · SIUP at 38wk0d  ? BP WNL, UA 2+ LE today without symptoms. Recent UCx without growth  ?  GBS negative  ? US for position demonstrated Vertex presentation  ? Follow up in 1 weeks with Dr Xochitl Islas     - Anemia: last HgB 9.0 unchanged from previous              * Continue FeSO4 325mg BID              * Repeat CBC in 1 week     · Other  ? Continuity Provider: Mayra  ? Pain mgmt. in labor: planning for unmedicated, was unmedicated with first two deliveries  ? Feeding:  breastfeeding  ? Social: expecting baby girl Eunice Li, concerns about ability to pay medical bills      Orders Placed This Encounter    AMB POC URINALYSIS DIP STICK AUTO W/O MICRO     Labor precautions discussed, including: Regular painful contractions, lasting for greater than one hour, taking your breath away; any vaginal bleeding; any leakage of fluid; or absent or decreased fetal movement. Call M.D. on call if any of these symptoms or signs occur. I have discussed the diagnosis with the patient and the intended plan as seen in the above orders. The patient has received an after-visit summary and questions were answered concerning future plans. I have discussed medication side effects and warnings with the patient as well. Informed pt to return to the office or go to the ER if she experiences vaginal bleeding, vaginal discharge, leaking of fluid, pelvic cramping.     Patient discussed with Dr. Evens Johnson MD (attending physician)    Doc Gary MD  Family Medicine Resident

## 2019-10-07 ENCOUNTER — ROUTINE PRENATAL (OUTPATIENT)
Dept: FAMILY MEDICINE CLINIC | Age: 29
End: 2019-10-07

## 2019-10-07 DIAGNOSIS — Z34.93 PRENATAL CARE IN THIRD TRIMESTER: Primary | ICD-10-CM

## 2019-10-07 NOTE — PROGRESS NOTES
Chief Complaint   Patient presents with    Routine Prenatal Visit     .  38w 0d today. No bleeding or LOF.  +FM. 1. Have you been to the ER, urgent care clinic since your last visit? Hospitalized since your last visit? No    2. Have you seen or consulted any other health care providers outside of the [address] [place] [address] since your last visit? Include any pap smears or colon screening.  No.

## 2019-10-07 NOTE — PATIENT INSTRUCTIONS
Aleks Goncalves a hernandez médico yola el embarazo (después de 20 semanas) - [ Learning About When to Call Your Doctor During Pregnancy (After 20 Weeks) ]  Instrucciones de cuidado  Es normal que tenga inquietudes acerca de lo que podría ser un problema yola el Cathryn Meyers. Aunque la mayoría de las mujeres embarazadas no tienen ningún problema grave, es importante saber cuándo llamar a hernandez médico si tiene determinados síntomas o señales de trabajo de Hernandez. Estas son algunas sugerencias generales. Hernandez médico puede darle más información sobre cuándo llamar. Cuándo llamar a hernandez médico (después de 20 semanas)  Llame al 911 en cualquier momento que considere que necesita atención de [place]. Por ejemplo, llame si:  · Tiene sangrado vaginal intenso. · Tiene dolor repentino e intenso en el abdomen. · Se desmayó (perdió el conocimiento). · Tiene raimundo convulsión. · Ve o siente el cordón umbilical.  · Suellen que está a punto de chrissy a gaurav a hernandez bebé y no puede llegar en forma crow al hospital.  Colletta Marly a hernandez médico ahora mismo o busque atención médica inmediata si:  · Tiene sangrado vaginal.  · Tiene dolor en el abdomen. · Tiene fiebre. · Tiene síntomas de preeclampsia, christel:  ? Hinchazón repentina de la delma, las mani o los pies. ? Nuevos problemas de visión (christel oscurecimiento, angel borroso o angel puntos). ? Dolor de messi intenso. · Tiene raimundo pérdida repentina de líquido por la vagina. (Piensa que rompió la mratha). · Piensa que puede yohannes comenzado el [place] de Hernandez. [place] significa que ha tenido al menos 6 contracciones en [employer]. · Nota que hernandez bebé ha dejado de moverse o lo hace mucho menos de lo habitual.  · Tiene síntomas de raimundo infección urinaria. Estos pueden incluir:  ? Dolor o ardor al orinar. ? Necesidad de orinar con frecuencia sin poder eliminar mucha orina. ? Dolor en el flanco, que se encuentra zenaida debajo de la caja torácica y [place] de la cintura en un lado de la espalda.   ? Negro en la Analisa montelongo. Preste especial atención a los cambios en busch jory y asegúrese de comunicarse con busch médico si:  · Tiene flujo vaginal con un olor desagradable. · Tiene cambios en la piel, tales christel:  ? Salpullido. ? Comezón. ? Color amarillento en la piel. · Tiene otras inquietudes acerca de busch embarazo. Si tiene signos de trabajo de parto al llegar a las 37 [facility] o más  Si tiene señales de [place] de parto a las 37 semanas o [place], es posible que busch médico le diga que llame cuando busch trabajo de parto se vuelva más [place]. Los síntomas del trabajo de parto activo incluyen:  · Contracciones que son regulares. · Contracciones a intervalos de menos de 5 minutos. · Contracciones yola las cuales es difícil hablar. La atención de seguimiento es raimundo parte clave de busch tratamiento y seguridad. Asegúrese de hacer y acudir a todas las citas, y llame a busch médico si está teniendo problemas. También es raimundo buena idea saber los resultados de kolby exámenes y mantener raimundo lista de los medicamentos que milan. ¿Dónde puede encontrar más información en inglés? [facility] a http://bill-abhishek.info/. Escriba R150 en la búsqueda para aprender más acerca de \"Aprenda cuándo llamar a busch médico yola el embarazo (después de 20 semanas) - [ Learning About When to Call Your Doctor During Pregnancy (After 20 Weeks) ]. \"  Revisado: 29 reyna, 2019  Versión del contenido: 12.2  © 1653-9394 [employer]. Las instrucciones de cuidado fueron adaptadas bajo licencia por [employer] (which disclaims liability or warranty for this information). Si usted tiene [place] afección médica o sobre estas instrucciones, siempre pregunte a busch profesional de jory. [facility] niega toda garantía o responsabilidad por busch uso de esta información. Semana 38 de busch embarazo:  Instrucciones de cuidado - [ Week 45 of Your Pregnancy: Care Instructions ]  Instrucciones de cuidado    Aunque no lo crea, busch bebé está por nacer. Es posible que ya tenga ideas acerca de la personalidad de busch bebé debido a cuánto se mueve. O nilesh vez haya notado cómo responde a los sonidos, al calor, al frío y a la gaurav. Incluso podría saber qué tipo de [place] a busch bebé. A estas [place], usted tiene [address] idea de qué puede esperar yola el [place]. Es posible que haya hablado de kolby preferencias del nacimiento con busch médico. Vincenzo incluso si usted quiere un nacimiento por vía vaginal, es raimundo buena idea aprender [employer] nacimientos por cesárea. Los partos por cesárea son [employer] bebés nacen por medio de un jelani (incisión) hecho en la parte inferior del abdomen. A veces, es la mejor opción para la jory del bebé y de Mauricio. Esta hoja de cuidados puede ayudarla a entender los nacimientos por cesárea. También le da información sobre qué esperar después del nacimiento de busch bebé. Y la ayuda a comprender [employer] depresión posparto. La atención de seguimiento es raimundo parte clave de busch tratamiento y seguridad. Asegúrese de hacer y acudir a todas las citas, y llame a busch médico si está teniendo problemas. También es raimundo buena idea saber los resultados de kolby exámenes y mantener raimundo lista de los medicamentos que milan. ¿Cómo puede cuidarse en el hogar? Aprenda sobre el parto por cesárea  · La mayoría de los partos por cesárea no están planeados. Se hacen por problemas que se producen yola el trabajo de [place]. Estos problemas podrían incluir:  ? El [place] de parto se vuelve más lento o se detiene. ? Presión arterial bing u otros problemas para la madre.  ? Señales de sufrimiento del bebé. Estas señales pueden incluir raimundo frecuencia cardíaca muy rápida o lenta. · [place] y los bebés están honey después de un parto por cesárea, la cesárea es raimundo Lynett Keens.  Tiene más riesgos que un parto vaginal.  · En algunos casos, un parto por cesárea planificado puede ser Patricio Mosquera un parto vaginal. [place] puede ser el tin si:  ? La madre tiene un problema de jory, christel raimundo afección cardíaca. ? El bebé no está en posición con la messi para abajo para el parto. Esta posición se llama de nalgas. ? El útero tiene cicatrices de cirugías anteriores. [place] podría aumentar la probabilidad de un desgarro en el [place]. ? Hay un problema con la placenta. ? La madre tiene raimundo infección, christel herpes genital, que podría transmitirse al bebé. ? La madre está embarazada con mellizos o más bebés. ? El bebé pesa de 9 a 8 libras o más. · Debido a los riesgos del parto por cesárea, las cesáreas planeadas deberían hacerse generalmente solo por motivos médicos. Y raimundo cesárea planeada debería hacerse en la semana 44 o más tarde mer que haya un motivo médico para hacerla antes. Sepa lo que ocurrirá después del parto y cómo planificar las primeras semanas en busch hogar  · Usted, busch bebé y busch devaughn o instructor Ayesha Longs bandas de identificación. Solo las personas que tengan bandas que coincidan podrán retirar al bebé de la daniel de recién nacidos. · Aprenderá a alimentar a busch bebé, cambiar el pañal y bañar al bebé. Ashok Cruz a cuidar del muñón del cordón umbilical. Si Kei Marilyks a circuncidar a busch bebé, también aprenderá a cuidar raimundo circuncisión. · Pídale a las visitas que esperen a visitarla cuando esté en busch hogar. Y pídales que se laven las mani antes de tocar al bebé. · Asegúrese de tener otro adulto en casa por lo menos 2 o 3 días después del Hernandez. · Margo las primeras 2 semanas, limite las visitas de familiares y amigos. · No permita visitantes que tengan resfriados o infecciones. Asegúrese de que todos los visitantes estén al día con kolby vacunas. Nunca deje que nadie fume cerca de busch bebé. · Trate de dormir raimundo siesta cuando busch bebé duerma. Sea consciente de la depresión posparto  · La \"melancolía de la maternidad\" es común en las primeras 1 o 2 semanas después del [place].  Es posible que tenga ganas de llorar o se sienta carol o irritable sin motivo alguno. · En algunas mujeres, estas emociones wiseman más tiempo y son más intensas. A esto se lo conoce christel depresión posparto. · Si kolby síntomas wiseman más de unas pocas semanas, o si se siente muy deprimida, pídale ayuda a busch médico.  · La depresión posparto sí puede tratarse. Los grupos de apoyo y la asesoría psicológica pueden ser de [facility]. A veces, los medicamentos también pueden ayudar. ¿Dónde puede encontrar más información en inglés? Acey Smaller a http://bill-abhishek.info/. Escriba B044 en la búsqueda para aprender más acerca de \"Semana 45 de busch embarazo: Instrucciones de cuidado - [ Week 45 of Your Pregnancy: Care Instructions ]. \"  Revisado: 29 Malin, 2019  Versión del contenido: 12.2  © 4172-5847 Healthwise, Incorporated. Las instrucciones de cuidado fueron adaptadas bajo licencia por [employer] (which disclaims liability or warranty for this information). Si usted tiene [place] afección médica o sobre estas instrucciones, siempre pregunte a busch profesional de jory. Healthwise, Incorporated niega toda garantía o responsabilidad por busch uso de esta información.

## 2019-10-08 VITALS
HEIGHT: 62 IN | WEIGHT: 168 LBS | OXYGEN SATURATION: 98 % | RESPIRATION RATE: 16 BRPM | DIASTOLIC BLOOD PRESSURE: 61 MMHG | SYSTOLIC BLOOD PRESSURE: 96 MMHG | BODY MASS INDEX: 30.91 KG/M2

## 2019-10-15 ENCOUNTER — ROUTINE PRENATAL (OUTPATIENT)
Dept: FAMILY MEDICINE CLINIC | Age: 29
End: 2019-10-15

## 2019-10-15 VITALS
OXYGEN SATURATION: 99 % | RESPIRATION RATE: 18 BRPM | SYSTOLIC BLOOD PRESSURE: 121 MMHG | WEIGHT: 171 LBS | DIASTOLIC BLOOD PRESSURE: 73 MMHG | HEIGHT: 62 IN | TEMPERATURE: 98.1 F | BODY MASS INDEX: 31.47 KG/M2 | HEART RATE: 100 BPM

## 2019-10-15 DIAGNOSIS — Z34.80 SUPERVISION OF OTHER NORMAL PREGNANCY: Primary | ICD-10-CM

## 2019-10-15 DIAGNOSIS — O99.013 ANEMIA DURING PREGNANCY IN THIRD TRIMESTER: ICD-10-CM

## 2019-10-15 LAB
BILIRUB UR QL STRIP: NEGATIVE
GLUCOSE UR-MCNC: NEGATIVE MG/DL
KETONES P FAST UR STRIP-MCNC: NEGATIVE MG/DL
PH UR STRIP: 7 [PH] (ref 4.6–8)
PROT UR QL STRIP: NEGATIVE
SP GR UR STRIP: 1.01 (ref 1–1.03)
UA UROBILINOGEN AMB POC: NORMAL (ref 0.2–1)
URINALYSIS CLARITY POC: NORMAL
URINALYSIS COLOR POC: YELLOW
URINE BLOOD POC: NORMAL
URINE LEUKOCYTES POC: NORMAL
URINE NITRITES POC: NEGATIVE

## 2019-10-15 NOTE — PROGRESS NOTES
Return OB Visit       Subjective:   Nina Bautista 26 y.o.   SABAS: 10/21/2019, by Last Menstrual Period  GA:  39w1d. LOF: none  Vaginal bleeding: none  Fetal movement: yes   Contractions: was having contractions 2 days ago every 5-10 minutes for < 1 hour; now they come and go every 1-2 hours with tightness in the lowe abdomen      Allergies- reviewed:   No Known Allergies  Medications- reviewed:   Current Outpatient Medications   Medication Sig    ferrous sulfate (IRON) 325 mg (65 mg iron) EC tablet Take 1 Tab by mouth daily.  PNV No12-Iron-FA-DSS-OM-3 29 mg iron-1 mg -50 mg CPKD Take  by mouth. No current facility-administered medications for this visit.       Past Medical History- reviewed:  Past Medical History:   Diagnosis Date    Breast disorder      Past Surgical History- reviewed:   Past Surgical History:   Procedure Laterality Date    HX BREAST LUMPECTOMY Right      Social History- reviewed:  Social History     Socioeconomic History    Marital status:      Spouse name: Not on file    Number of children: Not on file    Years of education: Not on file    Highest education level: Not on file   Occupational History    Not on file   Social Needs    Financial resource strain: Not on file    Food insecurity:     Worry: Not on file     Inability: Not on file    Transportation needs:     Medical: Not on file     Non-medical: Not on file   Tobacco Use    Smoking status: Never Smoker    Smokeless tobacco: Never Used   Substance and Sexual Activity    Alcohol use: Never     Frequency: Never    Drug use: Never    Sexual activity: Yes     Partners: Male   Lifestyle    Physical activity:     Days per week: Not on file     Minutes per session: Not on file    Stress: Not on file   Relationships    Social connections:     Talks on phone: Not on file     Gets together: Not on file     Attends Evangelical service: Not on file     Active member of club or organization: Not on file Attends meetings of clubs or organizations: Not on file     Relationship status: Not on file    Intimate partner violence:     Fear of current or ex partner: Not on file     Emotionally abused: Not on file     Physically abused: Not on file     Forced sexual activity: Not on file   Other Topics Concern    Not on file   Social History Narrative    Not on file     Immunizations- reviewed:   Immunization History   Administered Date(s) Administered    Influenza Vaccine (Quad) PF 2019    Tdap 2019       Objective:     Visit Vitals  /73 (BP 1 Location: Left arm, BP Patient Position: Sitting)   Pulse 100   Temp 98.1 °F (36.7 °C) (Oral)   Resp 18   Ht 5' 1.5\" (1.562 m)   Wt 171 lb (77.6 kg)   LMP 2019 (Exact Date)   SpO2 99%   BMI 31.79 kg/m²       Physical Exam:  GENERAL APPEARANCE: alert, well appearing, in no apparent distress  ABDOMEN: gravid, fundal height 38 cm, FHT present at 135 bpm  CVE; cervix soft but long, 2-3cm external os, -3 station  PSYCH: normal mood and affect    Labs  Recent Results (from the past 12 hour(s))   AMB POC URINALYSIS DIP STICK AUTO W/O MICRO    Collection Time: 10/15/19  1:43 PM   Result Value Ref Range    Color (UA POC) Yellow     Clarity (UA POC) Slightly Cloudy     Glucose (UA POC) Negative Negative    Bilirubin (UA POC) Negative Negative    Ketones (UA POC) Negative Negative    Specific gravity (UA POC) 1.010 1.001 - 1.035    Blood (UA POC) Trace Negative    pH (UA POC) 7.0 4.6 - 8.0    Protein (UA POC) Negative Negative    Urobilinogen (UA POC) 0.2 mg/dL 0.2 - 1    Nitrites (UA POC) Negative Negative    Leukocyte esterase (UA POC) 1+ Negative         Assessment         ICD-10-CM ICD-9-CM    1. Supervision of other normal pregnancy Z34.80 V22.1 AMB POC URINALYSIS DIP STICK AUTO W/O MICRO      CBC W/O DIFF   2.  Anemia during pregnancy in third trimester O99.013 648.23          Plan   26 y.o.  39w1d SABAS 10/21/2019, by Last Menstrual Period here for return OB visit     PNL: A+, HgB Fract neg, HepBsAg neg, HIV NR, Varicella/Rubella immune, G/C neg, RPR NR, CBC WNL, PAP NSIL. Declined genetic screening, GTT WNL, s/p Tdap and flu vaccine. GBS negative     · SIUP at 39wk1d  ? BP WNL, UA 2+ LE today without symptoms. Recent UCx without growth  ? GBS negative  ? US for position demonstrated Vertex presentation 10/7/19  ? Cervical exam today (see above)  ? Follow up in 1 weeek with Dr Kavitha Short     - Anemia: last HgB 9.0 unchanged from previous              * Continue FeSO4 325mg BID              * Repeat CBC today     · Other  ? Continuity Provider: Mayra  ? Pain mgmt. in labor: planning for unmedicated, was unmedicated with first two deliveries  ? Feeding:  breastfeeding  ? Social: expecting baby girl iLnda Bowens, concerns about ability to pay medical bills      Orders Placed This Encounter    CBC W/O DIFF    AMB POC URINALYSIS DIP STICK AUTO W/O MICRO     Labor precautions discussed, including: Regular painful contractions, lasting for greater than one hour, taking your breath away; any vaginal bleeding; any leakage of fluid; or absent or decreased fetal movement. Call M.D. on call if any of these symptoms or signs occur. I have discussed the diagnosis with the patient and the intended plan as seen in the above orders. The patient has received an after-visit summary and questions were answered concerning future plans. I have discussed medication side effects and warnings with the patient as well. Informed pt to return to the office or go to the ER if she experiences vaginal bleeding, vaginal discharge, leaking of fluid, pelvic cramping.     Patient discussed with Dr. Lu Crawley MD (attending physician)    Jean Vincent MD  Family Medicine Resident

## 2019-10-15 NOTE — PATIENT INSTRUCTIONS
Learning About When to Call Your Doctor During Pregnancy (After 20 Weeks)  Your Care Instructions  It's common to have concerns about what might be a problem during pregnancy. Although most pregnant women don't have any serious problems, it's important to know when to call your doctor if you have certain symptoms or signs of labor. These are general suggestions. Your doctor may give you some more information about when to call. When to call your doctor (after 20 weeks)  Call 911 anytime you think you may need emergency care. For example, call if:  · You have severe vaginal bleeding. · You have sudden, severe pain in your belly. · You passed out (lost consciousness). · You have a seizure. · You see or feel the umbilical cord. · You think you are about to deliver your baby and can't make it safely to the hospital.  Call your doctor now or seek immediate medical care if:  · You have vaginal bleeding. · You have belly pain. · You have a fever. · You have symptoms of preeclampsia, such as:  ? Sudden swelling of your face, hands, or feet. ? New vision problems (such as dimness, blurring, or seeing spots). ? A severe headache. · You have a sudden release of fluid from your vagina. (You think your water broke.)  · You think that you may be in labor. This means that you've had at least 6 contractions in an hour. · You notice that your baby has stopped moving or is moving much less than normal.  · You have symptoms of a urinary tract infection. These may include:  ? Pain or burning when you urinate. ? A frequent need to urinate without being able to pass much urine. ? Pain in the flank, which is just below the rib cage and above the waist on either side of the back. ? Blood in your urine. Watch closely for changes in your health, and be sure to contact your doctor if:  · You have vaginal discharge that smells bad. · You have skin changes, such as:  ? A rash. ? Itching.   ? Yellow color to your skin.  · You have other concerns about your pregnancy. If you have labor signs at 37 weeks or more  If you have signs of labor at 37 weeks or more, your doctor may tell you to call when your labor becomes more active. Symptoms of active labor include:  · Contractions that are regular. · Contractions that are less than 5 minutes apart. · Contractions that are hard to talk through. Follow-up care is a key part of your treatment and safety. Be sure to make and go to all appointments, and call your doctor if you are having problems. It's also a good idea to know your test results and keep a list of the medicines you take. Where can you learn more? Go to http://billGarden Mateabhishek.info/. Enter  in the search box to learn more about \"Learning About When to Call Your Doctor During Pregnancy (After 20 Weeks). \"  Current as of: May 29, 2019  Content Version: 12.2  © 4449-1222 [employer]. Care instructions adapted under license by [employer] (which disclaims liability or warranty for this information). If you have questions about a medical condition or this instruction, always ask your healthcare professional. [address] any warranty or liability for your use of this information. Semana 39 de busch embarazo: Instrucciones de cuidado - [ Week 44 of Your Pregnancy: Care Instructions ]  Instrucciones de cuidado    Margo estas últimas semanas, podría sentirse ansiosa por angel a busch nuevo bebé. Los bebés recién nacidos suelen tener un aspecto distinto al que ve en fotografías o películas. Inmediatamente después del nacimiento, es posible que la messi tenga raimundo forma extraña. Los ojos podrían estar inflamados. Y los genitales nilesh vez estén hinchados. Además, podrían tener la piel muy seca o marcas rojizas en los párpados, la nariz o el nataliia. De todos modos, la mayoría de los padres creen que kolby bebés son hermosos.   La atención de seguimiento es [place] parte clave de busch tratamiento y seguridad. Asegúrese de hacer y acudir a todas las citas, y llame a busch médico si está teniendo problemas. También es raimundo buena idea saber los resultados de kolby exámenes y mantener raimundo lista de los medicamentos que milan. ¿Cómo puede cuidarse en el hogar? Prepárese para amamantar  · Si amamanta, siga comiendo alimentos saludables. · Si busch proveedor de [place] se lo recomienda, siga tomando kolby vitaminas prenatales. · Hable con busch médico antes de kacy cualquier medicamento o suplemento. Eso es porque algunos medicamentos pueden afectar la [place]. · Puede ayudar a evitar dolor en los pezones alimentando a busch bebé en la posición correcta. Las [place] ayudarán a aprender [employer]. · Busch bebé recién nacido necesitará que lo alimente con raimundo frecuencia de 1 a 3 horas. Elija el método anticonceptivo correcto después de que nazca el bebé  · Las mujeres que están amamantando aún pueden [place]. Use un método anticonceptivo si no quiere quedar embarazada. · Los dispositivos intrauterinos (DIU) son muy eficaces para prevenir el embarazo y pueden proporcionar protección contra el embarazo por entre 3 y 11 años, según el tipo. Si usted habla con busch médico antes de tener a busch bebé, pueden colocarle el DIU inmediatamente después de chrissy a gaurav. Si usted decide que desea quedar embarazada más adelante, pueden extraérselo. Son seguros de usar Peggyann Relic. · Un implante hormonal también es muy eficaz para prevenir el embarazo. Se coloca debajo de la piel del brazo. [place] Hart[place] puede hacerse inmediatamente después de chrissy a gaurav. Libera la hormona progestina y previene el embarazo por alrededor de 3 años. También puede extraerlo un médico en cualquier momento. Es seguro de usar mientras está amamantando. · Se puede usar Depo-Provera mientras está amamantando. Es raimundo inyección que se aplica cada 3 meses. · Las pastillas anticonceptivas funcionan honey.  Vincenzo usted necesita raimundo clase diferente de pastilla las primeras semanas después de chrissy a gaurav. Y cuando comience a kacy estas pastillas, tendrá que asegurarse de usar otro tipo de anticonceptivo por 7 días después de comenzar el paquete. · Los diafragmas, los capuchones cervicales y los condones con espermicidas son menos eficaces después de que da a gaurav. Si usted tiene un diafragma o un capuchón cervical, hable con busch médico para angel si necesita un tamaño diferente. · Tanto la ligadura de trompas (atadura de trompas) christel la vasectomía son permanentes. Estas son [place] opciones si está crow de que ya no va a querer [facility]. ¿Dónde puede encontrar más información en [facility]? Mendy Frank a http://bill-abhishek.info/. Ashley Howard H579 en la búsqueda para aprender más acerca de \"Semana 44 de busch embarazo: Instrucciones de cuidado - [ Week 44 of Your Pregnancy: Care Instructions ]. \"  Revisado: 29 reyna, 2019  Versión del contenido: 12.2  © 5001-7819 Healthwise, Incorporated. Las instrucciones de cuidado fueron adaptadas bajo licencia por [facility] Connections (which disclaims liability or warranty for this information). Si usted tiene [place] afección médica o sobre estas instrucciones, siempre pregunte a busch profesional de jory. Healthwise, Incorporated niega toda garantía o responsabilidad por busch uso de esta información.

## 2019-10-16 LAB
ERYTHROCYTE [DISTWIDTH] IN BLOOD BY AUTOMATED COUNT: 16 % (ref 12.3–15.4)
HCT VFR BLD AUTO: 32 % (ref 34–46.6)
HGB BLD-MCNC: 9.7 G/DL (ref 11.1–15.9)
MCH RBC QN AUTO: 22.2 PG (ref 26.6–33)
MCHC RBC AUTO-ENTMCNC: 30.3 G/DL (ref 31.5–35.7)
MCV RBC AUTO: 73 FL (ref 79–97)
PLATELET # BLD AUTO: 345 X10E3/UL (ref 150–450)
RBC # BLD AUTO: 4.37 X10E6/UL (ref 3.77–5.28)
WBC # BLD AUTO: 8.3 X10E3/UL (ref 3.4–10.8)

## 2019-10-23 ENCOUNTER — ROUTINE PRENATAL (OUTPATIENT)
Dept: FAMILY MEDICINE CLINIC | Age: 29
End: 2019-10-23

## 2019-10-23 VITALS
HEIGHT: 62 IN | DIASTOLIC BLOOD PRESSURE: 64 MMHG | TEMPERATURE: 95.3 F | SYSTOLIC BLOOD PRESSURE: 103 MMHG | RESPIRATION RATE: 18 BRPM | HEART RATE: 91 BPM | BODY MASS INDEX: 31.28 KG/M2 | OXYGEN SATURATION: 99 % | WEIGHT: 170 LBS

## 2019-10-23 DIAGNOSIS — Z34.93 PRENATAL CARE IN THIRD TRIMESTER: Primary | ICD-10-CM

## 2019-10-23 LAB
BILIRUB UR QL STRIP: NEGATIVE
GLUCOSE UR-MCNC: NEGATIVE MG/DL
KETONES P FAST UR STRIP-MCNC: NEGATIVE MG/DL
PH UR STRIP: 6.5 [PH] (ref 4.6–8)
PROT UR QL STRIP: NEGATIVE
SP GR UR STRIP: 1.01 (ref 1–1.03)
UA UROBILINOGEN AMB POC: NORMAL (ref 0.2–1)
URINALYSIS CLARITY POC: NORMAL
URINALYSIS COLOR POC: YELLOW
URINE BLOOD POC: NORMAL
URINE LEUKOCYTES POC: NORMAL
URINE NITRITES POC: NEGATIVE

## 2019-10-23 NOTE — PROGRESS NOTES
I reviewed with the resident the medical history and the resident's findings on the physical examination. I discussed with the resident the patient's diagnosis and concur with the plan.     26 y.o. yo   at 40w2d.    +Fetal movements, no vaginal bleeding, no ctx. Visit Vitals  /64   Pulse 91   Temp 95.3 °F (35.2 °C) (Oral)   Resp 18   Ht 5' 1.5\" (1.562 m)   Wt 170 lb (77.1 kg)   SpO2 99%   BMI 31.60 kg/m²          Results for orders placed or performed in visit on 10/23/19   AMB POC URINALYSIS DIP STICK AUTO W/O MICRO     Status: None   Result Value Ref Range Status    Color (UA POC) Yellow  Final    Clarity (UA POC) Slightly Cloudy  Final    Glucose (UA POC) Negative Negative Final    Bilirubin (UA POC) Negative Negative Final    Ketones (UA POC) Negative Negative Final    Specific gravity (UA POC) 1.010 1.001 - 1.035 Final    Blood (UA POC) Trace Negative Final    pH (UA POC) 6.5 4.6 - 8.0 Final    Protein (UA POC) Negative Negative Final    Urobilinogen (UA POC) 0.2 mg/dL 0.2 - 1 Final    Nitrites (UA POC) Negative Negative Final    Leukocyte esterase (UA POC) 2+ Negative Final     NST reactive.   CVE: 3/50/-3    IOL with Pitocin is scheduled on Friday 10/25/2019

## 2019-10-23 NOTE — PROGRESS NOTES
Chief Complaint   Patient presents with    Routine Prenatal Visit     .  40w 2d today. No bleeding or LOF.  +FM. 1. Have you been to the ER, urgent care clinic since your last visit? Hospitalized since your last visit? No    2. Have you seen or consulted any other health care providers outside of the [address] [place] [address] since your last visit? Include any pap smears or colon screening.  No

## 2019-10-23 NOTE — PROGRESS NOTES
Return OB Visit       Subjective:   Myrtle Benton 26 y.o.   SABAS: 10/21/2019, by Last Menstrual Period  GA:  40w2d. LOF: none  Vaginal bleeding: none  Fetal movement: yes  Contractions:gets intermittent pains that last 10 minutes at a time       Allergies- reviewed:   No Known Allergies  Medications- reviewed:   Current Outpatient Medications   Medication Sig    ferrous sulfate (IRON) 325 mg (65 mg iron) EC tablet Take 1 Tab by mouth daily.  PNV No12-Iron-FA-DSS-OM-3 29 mg iron-1 mg -50 mg CPKD Take  by mouth. No current facility-administered medications for this visit.       Past Medical History- reviewed:  Past Medical History:   Diagnosis Date    Breast disorder      Past Surgical History- reviewed:   Past Surgical History:   Procedure Laterality Date    HX BREAST LUMPECTOMY Right 2013     Social History- reviewed:  Social History     Socioeconomic History    Marital status:      Spouse name: Not on file    Number of children: Not on file    Years of education: Not on file    Highest education level: Not on file   Occupational History    Not on file   Social Needs    Financial resource strain: Not on file    Food insecurity:     Worry: Not on file     Inability: Not on file    Transportation needs:     Medical: Not on file     Non-medical: Not on file   Tobacco Use    Smoking status: Never Smoker    Smokeless tobacco: Never Used   Substance and Sexual Activity    Alcohol use: Never     Frequency: Never    Drug use: Never    Sexual activity: Yes     Partners: Male   Lifestyle    Physical activity:     Days per week: Not on file     Minutes per session: Not on file    Stress: Not on file   Relationships    Social connections:     Talks on phone: Not on file     Gets together: Not on file     Attends Zoroastrian service: Not on file     Active member of club or organization: Not on file     Attends meetings of clubs or organizations: Not on file     Relationship status: Not on file    Intimate partner violence:     Fear of current or ex partner: Not on file     Emotionally abused: Not on file     Physically abused: Not on file     Forced sexual activity: Not on file   Other Topics Concern    Not on file   Social History Narrative    Not on file     Immunizations- reviewed:   Immunization History   Administered Date(s) Administered    Influenza Vaccine (Quad) PF 2019    Tdap 2019       Objective:     Visit Vitals  /64   Pulse 91   Temp 95.3 °F (35.2 °C) (Oral)   Resp 18   Ht 5' 1.5\" (1.562 m)   Wt 170 lb (77.1 kg)   LMP 2019 (Exact Date)   SpO2 99%   BMI 31.60 kg/m²       Physical Exam:  GENERAL APPEARANCE: alert, well appearing, in no apparent distress  ABDOMEN: gravid, fundal height 38 cm, FHT present at 145bpm  PSYCH: normal mood and affect  CVE: 3/50/-3    Labs  Recent Results (from the past 12 hour(s))   AMB POC URINALYSIS DIP STICK AUTO W/O MICRO    Collection Time: 10/23/19  9:26 AM   Result Value Ref Range    Color (UA POC) Yellow     Clarity (UA POC) Slightly Cloudy     Glucose (UA POC) Negative Negative    Bilirubin (UA POC) Negative Negative    Ketones (UA POC) Negative Negative    Specific gravity (UA POC) 1.010 1.001 - 1.035    Blood (UA POC) Trace Negative    pH (UA POC) 6.5 4.6 - 8.0    Protein (UA POC) Negative Negative    Urobilinogen (UA POC) 0.2 mg/dL 0.2 - 1    Nitrites (UA POC) Negative Negative    Leukocyte esterase (UA POC) 2+ Negative     NST: FHT 145bpm with accelerations, moderate variability, no ctx; reviewed and scanned into chart    Assessment         ICD-10-CM ICD-9-CM    1. Prenatal care in third trimester Z34.93 V22.1 AMB POC URINALYSIS DIP STICK AUTO W/O MICRO         Plan   26 y.o.  40w2d SABAS 10/21/2019, by Last Menstrual Period here for return OB visit     PNL: A+, HgB Fract neg, HepBsAg neg, HIV NR, Varicella/Rubella immune, G/C neg, RPR NR, CBC WNL, PAP NSIL.  Declined genetic screening, GTT WNL, s/p Tdap and flu vaccine. GBS negative     · SIUP at 39wk1d  ? BP WNL, UA 2+ LE today without symptoms. Recent UCx without growth  ? GBS negative  ? US for position demonstrated Vertex presentation 10/7/19  ? Cervical exam today (see above)  ? NST Category I reactive today  ? Plan for IOL 10/25/19 at 5:30am; discussed with Dr Jak Staley HgB 9.0 unchanged from previous              * Continue FeSO4 325mg BID              * Repeat CBC today     · Other  ? Continuity Provider: Mayra  ? Pain mgmt. in labor: planning for unmedicated, was unmedicated with first two deliveries  ? Feeding:  breastfeeding  ? Social: expecting baby girl [employer], concerns about ability to pay medical bills      Orders Placed This Encounter    AMB POC URINALYSIS DIP STICK AUTO W/O MICRO     Labor precautions discussed, including: Regular painful contractions, lasting for greater than one hour, taking your breath away; any vaginal bleeding; any leakage of fluid; or absent or decreased fetal movement. Call M.D. on call if any of these symptoms or signs occur. I have discussed the diagnosis with the patient and the intended plan as seen in the above orders. The patient has received an after-visit summary and questions were answered concerning future plans. I have discussed medication side effects and warnings with the patient as well. Informed pt to return to the office or go to the ER if she experiences vaginal bleeding, vaginal discharge, leaking of fluid, pelvic cramping.     Patient discussed with Dr. Kandice Rich (attending physician)    Harry Palmer MD  Family Medicine Resident

## 2019-10-24 ENCOUNTER — HOSPITAL ENCOUNTER (INPATIENT)
Age: 29
LOS: 2 days | Discharge: HOME | End: 2019-10-26
Attending: FAMILY MEDICINE | Admitting: OBSTETRICS & GYNECOLOGY
Payer: SELF-PAY

## 2019-10-24 ENCOUNTER — ANESTHESIA EVENT (OUTPATIENT)
Dept: LABOR AND DELIVERY | Age: 29
End: 2019-10-24
Payer: SELF-PAY

## 2019-10-24 ENCOUNTER — ANESTHESIA (OUTPATIENT)
Dept: LABOR AND DELIVERY | Age: 29
End: 2019-10-24
Payer: SELF-PAY

## 2019-10-24 PROBLEM — Z34.90 PREGNANCY: Status: ACTIVE | Noted: 2019-10-24

## 2019-10-24 LAB
A1 MICROGLOB PLACENTAL VAG QL: POSITIVE
ABO + RH BLD: NORMAL
BASOPHILS # BLD: 0 K/UL (ref 0–0.1)
BASOPHILS NFR BLD: 0 % (ref 0–1)
BLOOD GROUP ANTIBODIES SERPL: NORMAL
CONTROL LINE PRESENT?: ABNORMAL
DAILY QC (YES/NO)?: YES
DIFFERENTIAL METHOD BLD: ABNORMAL
EOSINOPHIL # BLD: 0 K/UL (ref 0–0.4)
EOSINOPHIL NFR BLD: 0 % (ref 0–7)
ERYTHROCYTE [DISTWIDTH] IN BLOOD BY AUTOMATED COUNT: 16.6 % (ref 11.5–14.5)
EXPIRATION DATE: ABNORMAL
HCT VFR BLD AUTO: 35.5 % (ref 35–47)
HGB BLD-MCNC: 10.6 G/DL (ref 11.5–16)
IMM GRANULOCYTES # BLD AUTO: 0 K/UL (ref 0–0.04)
IMM GRANULOCYTES NFR BLD AUTO: 0 % (ref 0–0.5)
INTERNAL NEGATIVE CONTROL: ABNORMAL
KIT LOT NO.: ABNORMAL
LYMPHOCYTES # BLD: 2.5 K/UL (ref 0.8–3.5)
LYMPHOCYTES NFR BLD: 22 % (ref 12–49)
MCH RBC QN AUTO: 22.4 PG (ref 26–34)
MCHC RBC AUTO-ENTMCNC: 29.9 G/DL (ref 30–36.5)
MCV RBC AUTO: 75.1 FL (ref 80–99)
MONOCYTES # BLD: 0.7 K/UL (ref 0–1)
MONOCYTES NFR BLD: 7 % (ref 5–13)
NEUTS SEG # BLD: 7.9 K/UL (ref 1.8–8)
NEUTS SEG NFR BLD: 71 % (ref 32–75)
NRBC # BLD: 0 K/UL (ref 0–0.01)
NRBC BLD-RTO: 0 PER 100 WBC
PH, VAGINAL FLUID: 6 (ref 5–6.1)
PLATELET # BLD AUTO: 354 K/UL (ref 150–400)
PMV BLD AUTO: 10.2 FL (ref 8.9–12.9)
RBC # BLD AUTO: 4.73 M/UL (ref 3.8–5.2)
SPECIMEN EXP DATE BLD: NORMAL
WBC # BLD AUTO: 11.2 K/UL (ref 3.6–11)

## 2019-10-24 PROCEDURE — 77030018836 HC SOL IRR NACL ICUM -A

## 2019-10-24 PROCEDURE — 74011250636 HC RX REV CODE- 250/636: Performed by: OBSTETRICS & GYNECOLOGY

## 2019-10-24 PROCEDURE — 86900 BLOOD TYPING SEROLOGIC ABO: CPT

## 2019-10-24 PROCEDURE — 74011250636 HC RX REV CODE- 250/636: Performed by: NURSE ANESTHETIST, CERTIFIED REGISTERED

## 2019-10-24 PROCEDURE — 75410000002 HC LABOR FEE PER 1 HR: Performed by: OBSTETRICS & GYNECOLOGY

## 2019-10-24 PROCEDURE — 74011000250 HC RX REV CODE- 250: Performed by: OBSTETRICS & GYNECOLOGY

## 2019-10-24 PROCEDURE — 85025 COMPLETE CBC W/AUTO DIFF WBC: CPT

## 2019-10-24 PROCEDURE — 76060000078 HC EPIDURAL ANESTHESIA: Performed by: OBSTETRICS & GYNECOLOGY

## 2019-10-24 PROCEDURE — 75410000003 HC RECOV DEL/VAG/CSECN EA 0.5 HR: Performed by: OBSTETRICS & GYNECOLOGY

## 2019-10-24 PROCEDURE — 77030018809 HC RETRCTR ALXSO DISP AMR -B

## 2019-10-24 PROCEDURE — 77010026065 HC OXYGEN MINIMUM MEDICAL AIR: Performed by: OBSTETRICS & GYNECOLOGY

## 2019-10-24 PROCEDURE — 77030008459 HC STPLR SKN COOP -B

## 2019-10-24 PROCEDURE — 74011000250 HC RX REV CODE- 250: Performed by: NURSE ANESTHETIST, CERTIFIED REGISTERED

## 2019-10-24 PROCEDURE — 99285 EMERGENCY DEPT VISIT HI MDM: CPT

## 2019-10-24 PROCEDURE — 77030005513 HC CATH URETH FOL11 MDII -B

## 2019-10-24 PROCEDURE — 76815 OB US LIMITED FETUS(S): CPT | Performed by: OBSTETRICS & GYNECOLOGY

## 2019-10-24 PROCEDURE — 76010000391 HC C SECN FIRST 1 HR: Performed by: OBSTETRICS & GYNECOLOGY

## 2019-10-24 PROCEDURE — 65270000029 HC RM PRIVATE

## 2019-10-24 PROCEDURE — 83986 ASSAY PH BODY FLUID NOS: CPT | Performed by: OBSTETRICS & GYNECOLOGY

## 2019-10-24 PROCEDURE — 74011250636 HC RX REV CODE- 250/636: Performed by: STUDENT IN AN ORGANIZED HEALTH CARE EDUCATION/TRAINING PROGRAM

## 2019-10-24 PROCEDURE — 77030040361 HC SLV COMPR DVT MDII -B

## 2019-10-24 PROCEDURE — 84112 EVAL AMNIOTIC FLUID PROTEIN: CPT | Performed by: OBSTETRICS & GYNECOLOGY

## 2019-10-24 PROCEDURE — 36415 COLL VENOUS BLD VENIPUNCTURE: CPT

## 2019-10-24 RX ORDER — EPHEDRINE SULFATE/0.9% NACL/PF 50 MG/5 ML
SYRINGE (ML) INTRAVENOUS AS NEEDED
Status: DISCONTINUED | OUTPATIENT
Start: 2019-10-24 | End: 2019-10-24 | Stop reason: HOSPADM

## 2019-10-24 RX ORDER — SODIUM CHLORIDE, SODIUM LACTATE, POTASSIUM CHLORIDE, CALCIUM CHLORIDE 600; 310; 30; 20 MG/100ML; MG/100ML; MG/100ML; MG/100ML
125 INJECTION, SOLUTION INTRAVENOUS CONTINUOUS
Status: DISCONTINUED | OUTPATIENT
Start: 2019-10-24 | End: 2019-10-26 | Stop reason: HOSPADM

## 2019-10-24 RX ORDER — SODIUM CHLORIDE 0.9 % (FLUSH) 0.9 %
5-40 SYRINGE (ML) INJECTION AS NEEDED
Status: DISCONTINUED | OUTPATIENT
Start: 2019-10-24 | End: 2019-10-24 | Stop reason: HOSPADM

## 2019-10-24 RX ORDER — ZOLPIDEM TARTRATE 5 MG/1
5 TABLET ORAL
Status: DISCONTINUED | OUTPATIENT
Start: 2019-10-24 | End: 2019-10-26 | Stop reason: HOSPADM

## 2019-10-24 RX ORDER — SODIUM CHLORIDE 0.9 % (FLUSH) 0.9 %
5-40 SYRINGE (ML) INJECTION EVERY 8 HOURS
Status: DISCONTINUED | OUTPATIENT
Start: 2019-10-24 | End: 2019-10-24 | Stop reason: HOSPADM

## 2019-10-24 RX ORDER — DOCUSATE SODIUM 100 MG/1
100 CAPSULE, LIQUID FILLED ORAL 2 TIMES DAILY
Status: DISCONTINUED | OUTPATIENT
Start: 2019-10-24 | End: 2019-10-26 | Stop reason: HOSPADM

## 2019-10-24 RX ORDER — DIPHENHYDRAMINE HYDROCHLORIDE 50 MG/ML
12.5 INJECTION, SOLUTION INTRAMUSCULAR; INTRAVENOUS
Status: DISCONTINUED | OUTPATIENT
Start: 2019-10-24 | End: 2019-10-26 | Stop reason: HOSPADM

## 2019-10-24 RX ORDER — OXYCODONE HYDROCHLORIDE 5 MG/1
10 TABLET ORAL
Status: ACTIVE | OUTPATIENT
Start: 2019-10-24 | End: 2019-10-25

## 2019-10-24 RX ORDER — SODIUM CHLORIDE, SODIUM LACTATE, POTASSIUM CHLORIDE, CALCIUM CHLORIDE 600; 310; 30; 20 MG/100ML; MG/100ML; MG/100ML; MG/100ML
1000 INJECTION, SOLUTION INTRAVENOUS CONTINUOUS
Status: DISCONTINUED | OUTPATIENT
Start: 2019-10-24 | End: 2019-10-24 | Stop reason: HOSPADM

## 2019-10-24 RX ORDER — ONDANSETRON 2 MG/ML
INJECTION INTRAMUSCULAR; INTRAVENOUS AS NEEDED
Status: DISCONTINUED | OUTPATIENT
Start: 2019-10-24 | End: 2019-10-24 | Stop reason: HOSPADM

## 2019-10-24 RX ORDER — DEXAMETHASONE SODIUM PHOSPHATE 4 MG/ML
INJECTION, SOLUTION INTRA-ARTICULAR; INTRALESIONAL; INTRAMUSCULAR; INTRAVENOUS; SOFT TISSUE AS NEEDED
Status: DISCONTINUED | OUTPATIENT
Start: 2019-10-24 | End: 2019-10-24 | Stop reason: HOSPADM

## 2019-10-24 RX ORDER — OXYCODONE HYDROCHLORIDE 5 MG/1
5 TABLET ORAL
Status: ACTIVE | OUTPATIENT
Start: 2019-10-24 | End: 2019-10-25

## 2019-10-24 RX ORDER — NALOXONE HYDROCHLORIDE 0.4 MG/ML
0.2 INJECTION, SOLUTION INTRAMUSCULAR; INTRAVENOUS; SUBCUTANEOUS
Status: ACTIVE | OUTPATIENT
Start: 2019-10-24 | End: 2019-10-25

## 2019-10-24 RX ORDER — SODIUM CHLORIDE 0.9 % (FLUSH) 0.9 %
5-40 SYRINGE (ML) INJECTION EVERY 8 HOURS
Status: DISCONTINUED | OUTPATIENT
Start: 2019-10-24 | End: 2019-10-25

## 2019-10-24 RX ORDER — HYDROCODONE BITARTRATE AND ACETAMINOPHEN 5; 325 MG/1; MG/1
1 TABLET ORAL
Status: DISCONTINUED | OUTPATIENT
Start: 2019-10-24 | End: 2019-10-26 | Stop reason: HOSPADM

## 2019-10-24 RX ORDER — NALOXONE HYDROCHLORIDE 0.4 MG/ML
0.4 INJECTION, SOLUTION INTRAMUSCULAR; INTRAVENOUS; SUBCUTANEOUS AS NEEDED
Status: DISCONTINUED | OUTPATIENT
Start: 2019-10-24 | End: 2019-10-26 | Stop reason: HOSPADM

## 2019-10-24 RX ORDER — KETOROLAC TROMETHAMINE 30 MG/ML
30 INJECTION, SOLUTION INTRAMUSCULAR; INTRAVENOUS
Status: DISPENSED | OUTPATIENT
Start: 2019-10-24 | End: 2019-10-25

## 2019-10-24 RX ORDER — SODIUM CHLORIDE 0.9 % (FLUSH) 0.9 %
5-40 SYRINGE (ML) INJECTION AS NEEDED
Status: DISCONTINUED | OUTPATIENT
Start: 2019-10-24 | End: 2019-10-26 | Stop reason: HOSPADM

## 2019-10-24 RX ORDER — BUPIVACAINE HYDROCHLORIDE 7.5 MG/ML
INJECTION, SOLUTION EPIDURAL; RETROBULBAR AS NEEDED
Status: DISCONTINUED | OUTPATIENT
Start: 2019-10-24 | End: 2019-10-24 | Stop reason: HOSPADM

## 2019-10-24 RX ORDER — MORPHINE SULFATE 0.5 MG/ML
INJECTION, SOLUTION EPIDURAL; INTRATHECAL; INTRAVENOUS AS NEEDED
Status: DISCONTINUED | OUTPATIENT
Start: 2019-10-24 | End: 2019-10-24 | Stop reason: HOSPADM

## 2019-10-24 RX ORDER — IBUPROFEN 800 MG/1
800 TABLET ORAL EVERY 8 HOURS
Status: DISCONTINUED | OUTPATIENT
Start: 2019-10-24 | End: 2019-10-26 | Stop reason: HOSPADM

## 2019-10-24 RX ORDER — SIMETHICONE 80 MG
80 TABLET,CHEWABLE ORAL
Status: DISCONTINUED | OUTPATIENT
Start: 2019-10-24 | End: 2019-10-26 | Stop reason: HOSPADM

## 2019-10-24 RX ORDER — OXYTOCIN 10 [USP'U]/ML
INJECTION, SOLUTION INTRAMUSCULAR; INTRAVENOUS AS NEEDED
Status: DISCONTINUED | OUTPATIENT
Start: 2019-10-24 | End: 2019-10-24 | Stop reason: HOSPADM

## 2019-10-24 RX ORDER — OXYTOCIN/0.9 % SODIUM CHLORIDE 20/1000 ML
125-500 PLASTIC BAG, INJECTION (ML) INTRAVENOUS ONCE
Status: ACTIVE | OUTPATIENT
Start: 2019-10-24 | End: 2019-10-25

## 2019-10-24 RX ORDER — DIPHENHYDRAMINE HYDROCHLORIDE 50 MG/ML
12.5 INJECTION, SOLUTION INTRAMUSCULAR; INTRAVENOUS
Status: ACTIVE | OUTPATIENT
Start: 2019-10-24 | End: 2019-10-25

## 2019-10-24 RX ORDER — FENTANYL CITRATE 50 UG/ML
INJECTION, SOLUTION INTRAMUSCULAR; INTRAVENOUS AS NEEDED
Status: DISCONTINUED | OUTPATIENT
Start: 2019-10-24 | End: 2019-10-24 | Stop reason: HOSPADM

## 2019-10-24 RX ADMIN — ONDANSETRON HYDROCHLORIDE 4 MG: 2 SOLUTION INTRAMUSCULAR; INTRAVENOUS at 14:36

## 2019-10-24 RX ADMIN — KETOROLAC TROMETHAMINE 30 MG: 30 INJECTION, SOLUTION INTRAMUSCULAR at 16:43

## 2019-10-24 RX ADMIN — CEFAZOLIN 1 G: 1 INJECTION, POWDER, FOR SOLUTION INTRAMUSCULAR; INTRAVENOUS at 13:46

## 2019-10-24 RX ADMIN — DEXAMETHASONE SODIUM PHOSPHATE 8 MG: 4 INJECTION, SOLUTION INTRAMUSCULAR; INTRAVENOUS at 14:36

## 2019-10-24 RX ADMIN — SODIUM CHLORIDE 100 MCG: 9 INJECTION INTRAMUSCULAR; INTRAVENOUS; SUBCUTANEOUS at 14:16

## 2019-10-24 RX ADMIN — SODIUM CHLORIDE 100 MCG: 9 INJECTION INTRAMUSCULAR; INTRAVENOUS; SUBCUTANEOUS at 14:25

## 2019-10-24 RX ADMIN — OXYTOCIN 40 UNITS: 10 INJECTION, SOLUTION INTRAMUSCULAR; INTRAVENOUS at 14:23

## 2019-10-24 RX ADMIN — BUPIVACAINE HYDROCHLORIDE 1.6 ML: 7.5 INJECTION, SOLUTION EPIDURAL; RETROBULBAR at 14:01

## 2019-10-24 RX ADMIN — SODIUM CHLORIDE 100 MCG: 9 INJECTION INTRAMUSCULAR; INTRAVENOUS; SUBCUTANEOUS at 14:22

## 2019-10-24 RX ADMIN — Medication 10 MG: at 14:35

## 2019-10-24 RX ADMIN — MORPHINE SULFATE 200 MCG: 0.5 INJECTION, SOLUTION EPIDURAL; INTRATHECAL; INTRAVENOUS at 14:01

## 2019-10-24 RX ADMIN — SODIUM CHLORIDE 100 MCG: 9 INJECTION INTRAMUSCULAR; INTRAVENOUS; SUBCUTANEOUS at 14:08

## 2019-10-24 RX ADMIN — Medication 10 ML: at 22:27

## 2019-10-24 RX ADMIN — SODIUM CHLORIDE, POTASSIUM CHLORIDE, SODIUM LACTATE AND CALCIUM CHLORIDE: 600; 310; 30; 20 INJECTION, SOLUTION INTRAVENOUS at 14:23

## 2019-10-24 RX ADMIN — SODIUM CHLORIDE, SODIUM LACTATE, POTASSIUM CHLORIDE, AND CALCIUM CHLORIDE 125 ML/HR: 600; 310; 30; 20 INJECTION, SOLUTION INTRAVENOUS at 16:14

## 2019-10-24 RX ADMIN — SODIUM CHLORIDE, POTASSIUM CHLORIDE, SODIUM LACTATE AND CALCIUM CHLORIDE: 600; 310; 30; 20 INJECTION, SOLUTION INTRAVENOUS at 13:00

## 2019-10-24 RX ADMIN — SODIUM CHLORIDE 100 MCG: 9 INJECTION INTRAMUSCULAR; INTRAVENOUS; SUBCUTANEOUS at 14:10

## 2019-10-24 RX ADMIN — SODIUM CHLORIDE 100 MCG: 9 INJECTION INTRAMUSCULAR; INTRAVENOUS; SUBCUTANEOUS at 14:35

## 2019-10-24 RX ADMIN — FENTANYL CITRATE 20 MCG: 50 INJECTION, SOLUTION INTRAMUSCULAR; INTRAVENOUS at 14:01

## 2019-10-24 RX ADMIN — SODIUM CHLORIDE 100 MCG: 9 INJECTION INTRAMUSCULAR; INTRAVENOUS; SUBCUTANEOUS at 14:05

## 2019-10-24 RX ADMIN — KETOROLAC TROMETHAMINE 30 MG: 30 INJECTION, SOLUTION INTRAMUSCULAR at 22:26

## 2019-10-24 NOTE — ROUTINE PROCESS
Bedside and Verbal shift change report given to Carlos Santillan RN (oncoming nurse) by Omid Guzman, Student Nurse (offgoing nurse). Report included the following information SBAR, Kardex and MAR.

## 2019-10-24 NOTE — L&D DELIVERY NOTE
Delivery Summary    Patient: Rina Vargas MRN: [id number]  SSN: [id number]    YOB: 1990  Age: 26 y.o. Sex: female        Information for the patient's :  Adia Cummings, Pending Female Beth Ing [[id number]]       Labor Events:    Labor: No    Steroids: None   Cervical Ripening Date/Time:       Cervical Ripening Type: None   Antibiotics During Labor: No   Rupture Identifier: Sac 1    Rupture Date/Time: 10/24/2019 2:00 PM   Rupture Type: AROM;SROM   Amniotic Fluid Volume: Moderate    Amniotic Fluid Description: Meconium    Amniotic Fluid Odor: None    Induction: None       Induction Date/Time:        Indications for Induction:      Augmentation: None   Augmentation Date/Time:      Indications for Augmentation:     Labor complications: None       Additional complications:        Delivery Events:  Indications For Episiotomy:     Episiotomy: None   Perineal Laceration(s): None   Repaired:     Periurethral Laceration Location:      Repaired:     Labial Laceration Location:     Repaired:     Sulcal Laceration Location:     Repaired:     Vaginal Laceration Location:     Repaired:     Cervical Laceration Location:     Repaired:     Repair Suture: None   Number of Repair Packets:     Estimated Blood Loss (ml):  ml     Delivery Date: 10/24/2019    Delivery Time: 2:21 PM   Delivery Type: , Low Transverse     Details    Trial of Labor: No   Primary/Repeat: Primary   Priority: Emergency   Indications:  Breech       Sex:  Female     Gestational Age: 44w3d  Delivery Clinician:  Galdino Bright  Living Status: Living   Delivery Location: OR OR 1          APGARS  One minute Five minutes Ten minutes   Skin color: 1   1        Heart rate: 2   2        Grimace: 2   2        Muscle tone: 2   2        Breathin   2        Totals: 9   9          Presentation: Breech    Position:        Resuscitation Method:  Suctioning-bulb; Tactile Stimulation     Meconium Stained:  Thin      Cord Information: 3 Vessels  Complications: Nuchal Cord Without Compressions  Cord around: head  Delayed cord clamping? Yes  Cord clamped date/time:10/24/2019  2:22 PM  Disposition of Cord Blood: Discard    Blood Gases Sent?: No    Placenta:  Date/Time: 10/24/2019  2:23 PM  Removal: Manual Removal      Appearance: Normal      Measurements:  Birth Weight: 3.635 kg      Birth Length: 49.5 cm      Head Circumference: 34.5 cm      Chest Circumference: 36.5 cm     Abdominal Girth: 35 cm    Other Providers:   YVONNE CHI;ADRIENNE WINN;ROSALBA ROMERO;IVETT HANNAH;FERNANDO LOPES;JOSE VELASCO;MO MALIN;NARGIS MATHUR;RUBY COOLEY;JORGE LARES, Obstetrician;Primary Nurse;Primary [place] Nurse; Anesthesiologist;Crna;Nursery Nurse;Scrub Tech;Scrub Tech;Charge Nurse;Resident             Group B Strep: No results found for: Arthur Gosselin, GRBSEXT  Information for the patient's :  Samson Quinones, Pending Female [facility] [[id number]]   No results found for: ABORH, PCTABR, PCTDIG, BILI, ABORHEXT, ABORH    No results for input(s): PCO2CB, PO2CB, HCO3I, SO2I, IBD, PTEMPI, SPECTI, PHICB, ISITE, IDEV, IALLEN in the last 72 hours.

## 2019-10-24 NOTE — ANESTHESIA PREPROCEDURE EVALUATION
Relevant Problems   No relevant active problems       Anesthetic History   No history of anesthetic complications            Review of Systems / Medical History      Pulmonary  Within defined limits                 Neuro/Psych   Within defined limits           Cardiovascular                  Exercise tolerance: >4 METS     GI/Hepatic/Renal  Within defined limits              Endo/Other  Within defined limits           Other Findings              Physical Exam    Airway  Mallampati: III  TM Distance: 4 - 6 cm  Neck ROM: short neck   Mouth opening: Normal     Cardiovascular  Regular rate and rhythm,  S1 and S2 normal,  no murmur, click, rub, or gallop             Dental    Dentition: Lower dentition intact and Upper dentition intact     Pulmonary  Breath sounds clear to auscultation               Abdominal         Other Findings            Anesthetic Plan    ASA: 2, emergent  Anesthesia type: spinal            Anesthetic plan and risks discussed with: Patient

## 2019-10-24 NOTE — PROGRESS NOTES
13:13- Pt educated on amnisure test and it's purpose and plan to start IV for fluids/ meds if needed. Pt agreeable to plan    13:18- Amnisure collected and 10 min timer started for resulting. 13:18- Very faint line noted on amnisure. MD John states she also sees faint line. Per amnisure directions any second line can be interpreted as positive result. Pt SROM per amnisure, however MD John performs cervical exam at this time and calls pt 9cm with bulging bag. No fluid noted on pads underneath pt.     13:30- C/S called by MD John    13:47- EFM removed. Transferring pt to OR for C/S.    14:02- Meconium stained fluid noted on pads after spinal completed and pt rolled to supine position. 14:21- Pt delivered viable infant female via primary C/S. [facility] [address] noted prior to delivery. Nuchal x 3 noted upon delivery of fetal head and were reducible. Nursery RNs in [place] for delivery. 14:26- 9 and 9 apgars assigned by LUISA Akers at 1 and 5 min respectively. 17:00- MD Flavio resident notified pt total QBL 1064. Fundus firm U-1, bleeding scant now 79cc since returning from OR. Resident notifying MD John. Awaiting further orders. 17:15- MD John does not order further treatment of PPH at this time as bleeding is small and stable.

## 2019-10-24 NOTE — ANESTHESIA PROCEDURE NOTES
Spinal Block    Start time: 10/24/2019 1:55 PM  End time: 10/24/2019 2:00 PM  Performed by: Selene Brennan CRNA  Authorized by: Selene Brennan CRNA     Pre-procedure:   Indications: at surgeon's request and primary anesthetic  Preanesthetic Checklist: patient identified, risks and benefits discussed, anesthesia consent and timeout performed    Timeout Time: 13:56          Spinal Block:   Patient Position:  Right lateral decubitus  Prep Region:  Lumbar  Prep: chlorhexidine      Location:  L2-3  Technique:  Single shot        Needle:   Needle Type:  Pencan  Needle Gauge:  25 G  Attempts:  1      Events: CSF confirmed, no blood with aspiration and no paresthesia        Assessment:  Insertion:  Uncomplicated  Patient tolerance:  Patient tolerated the procedure well with no immediate complications

## 2019-10-24 NOTE — H&P
History & Physical    Name: John Myrick MRN: [id number]  SSN: [id number]    YOB: 1990  Age: 26 y.o. Sex: female        Subjective:     Estimated Date of Delivery: 10/21/19  OB History    Para Term  AB Living   3 2 2 0 0 2   SAB TAB Ectopic Molar Multiple Live Births             2      # Outcome Date GA Lbr Hero/2nd Weight Sex Delivery Anes PTL Lv   3 Current            2 Term 12/10/15   8 lb (3.629 kg) M Vag-Spont None  ROD   1 Term 11/05/10   7 lb (3.175 kg) F Vag-Spont None  ROD       Ms. Concepcion Govea a  at [address] [place] [address] for presenting for Increasingly painful contractions. These contractions started at 10am today and have increased in frequency and strength. She states that she has had a small volume loss of yellow fluid, which may have been urine. No vaginal bleeding, discharge or odor. No recent illness or sick contacts. She denies chest pain, SoB, Nausea, vomiting, or RUQ pain. Her 2 prior deliveries where uncomplicated vaginal deliveries with no pain medications. She prefers no pain meds this time too. Prenatal course was complicated by anemia. Please see prenatal records for details. Past Medical History:   Diagnosis Date    Anemia     Breast disorder      Past Surgical History:   Procedure Laterality Date    BREAST SURGERY PROCEDURE UNLISTED      HX BREAST LUMPECTOMY Right      Social History     Occupational History    Not on file   Tobacco Use    Smoking status: Never Smoker    Smokeless tobacco: Never Used   Substance and Sexual Activity    Alcohol use: Never     Frequency: Never    Drug use: Never    Sexual activity: Yes     Partners: Male     History reviewed. No pertinent family history. No Known Allergies  Prior to Admission medications    Medication Sig Start Date End Date Taking? Authorizing Provider   ferrous sulfate (IRON) 325 mg (65 mg iron) EC tablet Take 1 Tab by mouth daily.  19  Yes Billy Reddy MD   PNV No12-Iron-FA-DSS-OM-3 29 mg iron-1 mg -50 mg CPKD Take  by mouth. Yes Provider, Historical        Review of Systems: Review of Systems   Constitutional: Negative for chills, fatigue and fever. HENT: Negative for congestion, rhinorrhea, sinus pressure and sore throat. Eyes: Negative for pain and visual disturbance. Respiratory: Negative for cough, shortness of breath and wheezing. Cardiovascular: Negative for chest pain and palpitations. Gastrointestinal: Negative for constipation, diarrhea, nausea and vomiting. Endocrine: Negative for polydipsia and polyuria. Genitourinary: Positive for frequency. Negative for dysuria and hematuria. Musculoskeletal: Positive for back pain. Negative for arthralgias and myalgias. Skin: Negative for rash and wound. Allergic/Immunologic: Negative for immunocompromised state. Neurological: Negative for dizziness, weakness and headaches. Psychiatric/Behavioral: Negative for agitation. The patient is not nervous/anxious. Objective:     Vitals:  Vitals:    10/24/19 1323   Weight: 170 lb (77.1 kg)   Height: 5' 1.5\" (1.562 m)        Physical Exam:  Patient without distress. Heart: Regular rate and rhythm, S1S2 present or without murmur or extra heart sounds  Lung: clear to auscultation throughout lung fields, no wheezes, no rales, no rhonchi and normal respiratory effort  Back: costovertebral angle tenderness absent  Abdomen: soft, nontender  Fundus: soft and non tender  Cervical Exam: 9/100 %/ /  By Dr. Lisy Unger  Presentation: Jo Beltran, by bedside U/S  Membranes:  Bulging Bag  Fetal Heart Rate: Reactive  Baseline: 135 per minute  Variability: moderate  Accelerations: yes  Decelerations: none  Uterine contractions: regular, every 2 minutes    Prenatal Labs:   No results found for: RUBELLAEXT, GRBSEXT, HBSAGEXT, HIVEXT, RPREXT, GONNOEXT, CHLAMEXT      Assessment/Plan:     Ms. April Carlos a  at 40w3d for presenting for Increasingly painful contractions.     Active Labor- Pt presenting at 9cm with bulging bag and breech. FOB at bedside   -Stat C Section. Risks and benefits explained via bedside .    -Ppx Ancef given     SIUP   --PNL: A+, HgB Fract neg, HepBsAg neg, HIV NR, Varicella/Rubella immune, G/C neg, RPR NR, CBC WNL, PAP NSIL. Declined genetic screening, GTT WNL, s/p Tdap and flu vaccine. --Anemia- HgB 10.6 today from 9.7 on 10/15. On FeSO4 325mg BID    Group B Strep was negative.     Patient seen with Dr. Britt Caal MD  Family Medicine Resident

## 2019-10-25 LAB
BASOPHILS # BLD: 0 K/UL (ref 0–0.1)
BASOPHILS NFR BLD: 0 % (ref 0–1)
DIFFERENTIAL METHOD BLD: ABNORMAL
EOSINOPHIL # BLD: 0 K/UL (ref 0–0.4)
EOSINOPHIL NFR BLD: 0 % (ref 0–7)
ERYTHROCYTE [DISTWIDTH] IN BLOOD BY AUTOMATED COUNT: 16.5 % (ref 11.5–14.5)
HCT VFR BLD AUTO: 26.7 % (ref 35–47)
HGB BLD-MCNC: 8 G/DL (ref 11.5–16)
IMM GRANULOCYTES # BLD AUTO: 0.1 K/UL (ref 0–0.04)
IMM GRANULOCYTES NFR BLD AUTO: 0 % (ref 0–0.5)
LYMPHOCYTES # BLD: 2.2 K/UL (ref 0.8–3.5)
LYMPHOCYTES NFR BLD: 16 % (ref 12–49)
MCH RBC QN AUTO: 22.7 PG (ref 26–34)
MCHC RBC AUTO-ENTMCNC: 30 G/DL (ref 30–36.5)
MCV RBC AUTO: 75.9 FL (ref 80–99)
MONOCYTES # BLD: 0.9 K/UL (ref 0–1)
MONOCYTES NFR BLD: 7 % (ref 5–13)
NEUTS SEG # BLD: 10.4 K/UL (ref 1.8–8)
NEUTS SEG NFR BLD: 76 % (ref 32–75)
NRBC # BLD: 0 K/UL (ref 0–0.01)
NRBC BLD-RTO: 0 PER 100 WBC
PLATELET # BLD AUTO: 277 K/UL (ref 150–400)
PMV BLD AUTO: 10.1 FL (ref 8.9–12.9)
RBC # BLD AUTO: 3.52 M/UL (ref 3.8–5.2)
WBC # BLD AUTO: 13.7 K/UL (ref 3.6–11)

## 2019-10-25 PROCEDURE — 65270000029 HC RM PRIVATE

## 2019-10-25 PROCEDURE — 74011250637 HC RX REV CODE- 250/637: Performed by: STUDENT IN AN ORGANIZED HEALTH CARE EDUCATION/TRAINING PROGRAM

## 2019-10-25 PROCEDURE — 36415 COLL VENOUS BLD VENIPUNCTURE: CPT

## 2019-10-25 PROCEDURE — 85025 COMPLETE CBC W/AUTO DIFF WBC: CPT

## 2019-10-25 PROCEDURE — 74011250637 HC RX REV CODE- 250/637: Performed by: OBSTETRICS & GYNECOLOGY

## 2019-10-25 RX ORDER — LANOLIN ALCOHOL/MO/W.PET/CERES
1 CREAM (GRAM) TOPICAL 2 TIMES DAILY WITH MEALS
Status: DISCONTINUED | OUTPATIENT
Start: 2019-10-25 | End: 2019-10-26 | Stop reason: HOSPADM

## 2019-10-25 RX ADMIN — DOCUSATE SODIUM 100 MG: 100 CAPSULE, LIQUID FILLED ORAL at 17:09

## 2019-10-25 RX ADMIN — IBUPROFEN 800 MG: 800 TABLET ORAL at 20:55

## 2019-10-25 RX ADMIN — HYDROCODONE BITARTRATE AND ACETAMINOPHEN 1 TABLET: 5; 325 TABLET ORAL at 05:52

## 2019-10-25 RX ADMIN — DOCUSATE SODIUM 100 MG: 100 CAPSULE, LIQUID FILLED ORAL at 08:27

## 2019-10-25 RX ADMIN — IBUPROFEN 800 MG: 800 TABLET ORAL at 05:48

## 2019-10-25 RX ADMIN — HYDROCODONE BITARTRATE AND ACETAMINOPHEN 1 TABLET: 5; 325 TABLET ORAL at 11:36

## 2019-10-25 RX ADMIN — HYDROCODONE BITARTRATE AND ACETAMINOPHEN 1 TABLET: 5; 325 TABLET ORAL at 17:09

## 2019-10-25 RX ADMIN — FERROUS SULFATE TAB 325 MG (65 MG ELEMENTAL FE) 325 MG: 325 (65 FE) TAB at 17:09

## 2019-10-25 RX ADMIN — IBUPROFEN 800 MG: 800 TABLET ORAL at 13:43

## 2019-10-25 RX ADMIN — HYDROCODONE BITARTRATE AND ACETAMINOPHEN 1 TABLET: 5; 325 TABLET ORAL at 20:55

## 2019-10-25 RX ADMIN — FERROUS SULFATE TAB 325 MG (65 MG ELEMENTAL FE) 325 MG: 325 (65 FE) TAB at 08:27

## 2019-10-25 NOTE — OP NOTES
Operative Note    Name: Vince Sánchez   Medical Record Number: [id number]   YOB: 1990  Today's Date: 2019      Pre-operative Diagnosis: Breech, term labor    Post-operative Diagnosis: Breech    Operation: low transverse  section Procedure(s):   SECTION    Surgeon(s):  Kirt Platt MD    Anesthesia: Spinal    Prophylactic Antibiotics: Ancef  DVT Prophylaxis: Sequential Compression Devices         Fetal Description: tijerina     Birth Information:   Information for the patient's :  Marsha Carlos [[id number]]   Delivery of a 8 lb 0.2 oz (3.635 kg) female infant on 10/24/2019 at 2:21 PM. Apgars were 9  and 9 . Umbilical Cord: 3 Vessels     Umbilical Cord Events: Nuchal Cord Without Compressions     Placenta: Manual Removal removal with Normal appearance. Amniotic Fluid Volume: Moderate     Amniotic Fluid Description:  Meconium        Umbilical Cord: Nuchal Cord x  3    Placenta:  manual removal    Specimens: none  EBL: 138UM           Complications:  none  Implants: none  Circ-1: Sandee Patel  Scrub Tech-1: Christin HUBBARD  Scrub Tech-2: Electa Marine    Procedure Detail:      After proper patient identification and consent, the patient was taken to the operating room, where spinal anesthesia was administered and found to be adequate. Reeves catheter had been placed using sterile technique. The patient was prepped and draped in the normal sterile fashion. The abdomen was entered using the Pfannenstiel technique. The peritoneum was entered bluntely well superior to the bladder without any apparent injury. An Jesse retractor was placed. Palpation revealed no bowel below the retractor. The bladder flap was created without difficulty. A low transverse uterine incision was made with the scalpel and extended with blunt finger dissection. Amniotomy was performed and the fluid was small amount thick meconium.   The breech was then delivered atraumatically and the rest of the infant was delivered in the usual fashion for breech extraction. The nose and mouth were suctioned. The cord was clamped and cut and the baby was handed off to Nursing staff in attendance. The placenta was manually extracted. The uterus was wiped clean with a moist lap pad and cleared of all clots and debris. The uterine incision was closed in 2 layers, first with a running locked suture of 0-Monoccryl, then with an imbricated layer. Adequate hemostasis was noted. Both tubes and ovaries appeared normal. The pericolic gutters were then lavaged clean with normal saline. Good hemostasis was again reassured The Jesse retractor was removed. The fascia was closed with barbed stratifix symmetric suture in a running fashion. Good hemostasis was assured. The incision was lavaged clean and small bleeders were coagulated with the bovie. The skin was closed with absorbable staples. The patient tolerated the procedure well. Sponge, lap, and needle counts were correct times three and the patient and baby were taken to recovery/postpartum room in stable condition.     Darrin Avila MD  October 25, 2019  9:07 AM

## 2019-10-25 NOTE — PROGRESS NOTES
Bedside and Verbal shift change report given to TRISHA Cardoza RN (oncoming nurse) by Antionette Bearden RN (offgoing nurse). Report included the following information SBAR, Kardex, Intake/Output and MAR.

## 2019-10-25 NOTE — ROUTINE PROCESS
Bedside and Verbal shift change report given to HENNY Rossi RN (oncoming nurse) by TRISHA Tompkins (offgoing nurse). Report included the following information SBAR, Procedure Summary, Intake/Output, MAR, Accordion, Recent Results and Med Rec Status.

## 2019-10-25 NOTE — LACTATION NOTE
Patient seen for lactation consult. Patient with history of right breast lumpectomy, states she only wants to nurse on the left side until her milk comes in because she is afraid the right will not produce milk due to the surgery. Reassured that she could still attempt with colostrum, but patient states she does not wish to do that. This is second baby for patient and she breast/bottle fed last baby. While in the room, it was time for a feed and patient wanted to make sure the baby was latching correctly. Feeding attended by this nurse on left breast. Patient fed 20ml formula, similac proadvance, after baby finished breast feeding.

## 2019-10-25 NOTE — PROGRESS NOTES
Post-Operative Day Number 1 Progress Note    Patient doing well post-op day 1 from  delivery without significant complaints. Pain well controlled, (3/10). Lochia minimal.  Tolerating bland diet yesterday, to try regular diet today. Ambulating to bathroom. Reeves removed. Yes flatus. No BM. No concerns or questions this am.    Vitals:    Patient Vitals for the past 8 hrs:   BP Temp Pulse Resp   10/25/19 0411 98/60 97.7 °F (36.5 °C) 82 16   10/24/19 2329 95/54 97.8 °F (36.6 °C) 100 18     Temp (24hrs), Av.8 °F (36.6 °C), Min:97.7 °F (36.5 °C), Max:97.9 °F (36.6 °C)      Vital signs stable, afebrile.     Intake and Output:        Date 10/24/19 07 - 10/25/19 0659 10/25/19 0700 - 10/26/19 0659   Shift 2002-6626 4595-7232 24 Hour Total 9098-8110 9121-4359 24 Hour Total   INTAKE   I.V.(mL/kg/hr) 7187(7.0)  2000(1.1)        Volume (lactated Ringers infusion 1,000 mL) 2000      Shift Total(mL/kg) 2000(25.9)  2000(25.9)      OUTPUT   Urine(mL/kg/hr) 450(0.5) 3900(4.2) 4350(2.4)        Urine Voided  1900 1900        Urine Output 150  150        Urine Output (mL) ([REMOVED] Urinary Catheter 10/24/19 Reeves) 300 2000 2300      Blood 1964  1964        Estimated Blood Loss 900  900        Quantitative Blood Loss 1064  1064      Shift Total(mL/kg) 2414(31.3) 3900(50.6) 6314(81.9)      NET -264 -2709 -8548      Weight (kg) 77.1 77.1 77.1 77.1 77.1 77.1       Exam:   Patient without distress               CTAB, no w/r/r/c    RRR, + S1 and S2, no m/r/g    Abdomen soft, fundus firm and below the umbilicus, non tender                Incision covered with postop dressing, clean dry and intact, appropriately tender               Lower extremities negative for swelling, no cords or tenderness    Lab/Data Review:  Recent Results (from the past 12 hour(s))   CBC WITH AUTOMATED DIFF    Collection Time: 10/25/19  3:25 AM   Result Value Ref Range    WBC 13.7 (H) 3.6 - 11.0 K/uL    RBC 3.52 (L) 3.80 - 5.20 M/uL    HGB 8.0 (L) 11.5 - 16.0 g/dL    HCT 26.7 (L) 35.0 - 47.0 %    MCV 75.9 (L) 80.0 - 99.0 FL    MCH 22.7 (L) 26.0 - 34.0 PG    MCHC 30.0 30.0 - 36.5 g/dL    RDW 16.5 (H) 11.5 - 14.5 %    PLATELET 796 182 - 907 K/uL    MPV 10.1 8.9 - 12.9 FL    NRBC 0.0 0  WBC    ABSOLUTE NRBC 0.00 0.00 - 0.01 K/uL    NEUTROPHILS 76 (H) 32 - 75 %    LYMPHOCYTES 16 12 - 49 %    MONOCYTES 7 5 - 13 %    EOSINOPHILS 0 0 - 7 %    BASOPHILS 0 0 - 1 %    IMMATURE GRANULOCYTES 0 0.0 - 0.5 %    ABS. NEUTROPHILS 10.4 (H) 1.8 - 8.0 K/UL    ABS. LYMPHOCYTES 2.2 0.8 - 3.5 K/UL    ABS. MONOCYTES 0.9 0.0 - 1.0 K/UL    ABS. EOSINOPHILS 0.0 0.0 - 0.4 K/UL    ABS. BASOPHILS 0.0 0.0 - 0.1 K/UL    ABS. IMM. GRANS. 0.1 (H) 0.00 - 0.04 K/UL    DF AUTOMATED           Assessment and Plan:    Kenia Austin is a 26 y.o. [id number] s/p emergency C Section at 40 weeks 3 days for active labor with breech presentation. Patient appears to be having uncomplicated post- course. 1. Continue routine post-op care and maternal education including lactation assistance  2. Reeves has been removed. Continue to ambulate to bathroom  3. Incision bandage may be removed 24 hours post-op. 4.   Anemia: HgB 8.0 today from 10.6 on presentation and 9.7 on 10/15. QBL 1064mL. Continue home FeSO4 325mg BID    Prenatal course:   --PNL: A+, HgB Fract neg, HepBsAg neg, HIV NR, Varicella/Rubella immune, G/C neg, RPR NR, CBC WNL, PAP NSIL. Declined genetic screening, GTT WNL, s/p Tdap and flu vaccine.        Patient seen with Dr. Ralph Ford MD  Family Medicine Resident

## 2019-10-26 VITALS
OXYGEN SATURATION: 100 % | HEART RATE: 83 BPM | BODY MASS INDEX: 31.28 KG/M2 | SYSTOLIC BLOOD PRESSURE: 101 MMHG | DIASTOLIC BLOOD PRESSURE: 65 MMHG | RESPIRATION RATE: 18 BRPM | WEIGHT: 170 LBS | HEIGHT: 62 IN | TEMPERATURE: 97.5 F

## 2019-10-26 PROCEDURE — 74011250637 HC RX REV CODE- 250/637: Performed by: OBSTETRICS & GYNECOLOGY

## 2019-10-26 PROCEDURE — 74011250637 HC RX REV CODE- 250/637: Performed by: STUDENT IN AN ORGANIZED HEALTH CARE EDUCATION/TRAINING PROGRAM

## 2019-10-26 RX ORDER — IBUPROFEN 800 MG/1
800 TABLET ORAL
Qty: 90 TAB | Refills: 1 | Status: SHIPPED | OUTPATIENT
Start: 2019-10-26

## 2019-10-26 RX ORDER — HYDROCODONE BITARTRATE AND ACETAMINOPHEN 5; 325 MG/1; MG/1
1 TABLET ORAL
Qty: 15 TAB | Refills: 0 | Status: SHIPPED | OUTPATIENT
Start: 2019-10-26 | End: 2019-10-29

## 2019-10-26 RX ORDER — DOCUSATE SODIUM 100 MG/1
100 CAPSULE, LIQUID FILLED ORAL 2 TIMES DAILY
Qty: 30 CAP | Refills: 0 | Status: SHIPPED | OUTPATIENT
Start: 2019-10-26 | End: 2019-11-10

## 2019-10-26 RX ADMIN — FERROUS SULFATE TAB 325 MG (65 MG ELEMENTAL FE) 325 MG: 325 (65 FE) TAB at 08:17

## 2019-10-26 RX ADMIN — HYDROCODONE BITARTRATE AND ACETAMINOPHEN 1 TABLET: 5; 325 TABLET ORAL at 12:40

## 2019-10-26 RX ADMIN — IBUPROFEN 800 MG: 800 TABLET ORAL at 12:40

## 2019-10-26 RX ADMIN — DOCUSATE SODIUM 100 MG: 100 CAPSULE, LIQUID FILLED ORAL at 08:17

## 2019-10-26 RX ADMIN — HYDROCODONE BITARTRATE AND ACETAMINOPHEN 1 TABLET: 5; 325 TABLET ORAL at 05:43

## 2019-10-26 RX ADMIN — HYDROCODONE BITARTRATE AND ACETAMINOPHEN 1 TABLET: 5; 325 TABLET ORAL at 01:27

## 2019-10-26 RX ADMIN — IBUPROFEN 800 MG: 800 TABLET ORAL at 05:43

## 2019-10-26 NOTE — DISCHARGE SUMMARY
Obstetrical Discharge Summary     Name: Jarred Falk MRN: [id number]  SSN: [id number]    YOB: 1990  Age: 26 y.o. Sex: female      Admit Date: 10/24/2019    Discharge Date: 10/26/2019     Admitting Physician: Silviano Montes De Oca MD     Attending Physician:  Jany Prieto DO     Admission Diagnoses: Pregnancy [Z34.90]  Pregnancy [Z34.90]    Discharge Diagnoses:   Information for the patient's :  Rhonda Herbert [[id number]]   Delivery of a 3.635 kg female infant via , Low Transverse on 10/24/2019 at 2:21 PM  by Silviano Montes De Oca. Apgars were 9  and 9 . Additional Diagnoses:   Hospital Problems  Date Reviewed: 10/23/2019          Codes Class Noted POA    Pregnancy ICD-10-CM: Z34.90  ICD-9-CM: V22.2  10/24/2019 Unknown             Lab Results   Component Value Date/Time    Rubella, External immune 2019    GrBStrep, External negative 2019       Immunization(s):   Immunization History   Administered Date(s) Administered    Influenza Vaccine (Quad) PF 2019    Tdap 2019        Hospital Course:   Patient is a 26 y.o. [id number] s/p Emergency LTCS at 40w3d for advanced labor with breech presentation. Her surgery, delivery of baby girl and postpartum care has been without complications. Pregnancy complicated by anemia on iron supplementation. On day of discharge patient reported minimal lochia, well controlled pain requiring Norco 5mg, and no other complaints. Discharged with pain regimen, bowel regimen and instructions to continue home iron supplementation. Advised to continue prenatal vitamins. Follow up with OB/PCP in 2 weeks. Discharge Hgb 8.0. Prenatal course:   --PNL: A+, HgB Fract neg, HepBsAg neg, HIV NR, Varicella/Rubella immune, G/C neg, RPR NR, CBC WNL, PAP NSIL.  Declined genetic screening, GTT WNL, s/p Tdap and flu vaccine.     Condition at Discharge:  Stable  Disposition: Discharge to Home    Physical exam:  Visit Vitals  /65 (BP 1 Location: Left arm, BP Patient Position: At rest)   Pulse 83   Temp 97.5 °F (36.4 °C)   Resp 18   Ht 5' 1.5\" (1.562 m)   Wt 77.1 kg (170 lb)   LMP 2019 (Exact Date)   SpO2 100%   Breastfeeding? Unknown   BMI 31.60 kg/m²       Exam:  Patient without distress. CTAB, no w/r/r/c               RRR, + S1 and S2, no m/r/g               Abdomen soft, fundus firm at level of umbilicus, non tender    Incision clean dry and intact with steri-strips in place               Perineum with normal lochia noted. Lower extremities are negative for swelling, cords or tenderness. Patient Instructions:   Current Discharge Medication List      START taking these medications    Details   ibuprofen (MOTRIN) 800 mg tablet Take 1 Tab by mouth every eight (8) hours as needed for Pain. Qty: 90 Tab, Refills: 1      HYDROcodone-acetaminophen (NORCO) 5-325 mg per tablet Take 1 Tab by mouth every four (4) hours as needed for Pain for up to 3 days. Max Daily Amount: 6 Tabs. Qty: 15 Tab, Refills: 0    Associated Diagnoses: Delivery of pregnancy by  section      docusate sodium (COLACE) 100 mg capsule Take 1 Cap by mouth two (2) times a day for 30 doses. Qty: 30 Cap, Refills: 0         CONTINUE these medications which have NOT CHANGED    Details   ferrous sulfate (IRON) 325 mg (65 mg iron) EC tablet Take 1 Tab by mouth daily. Qty: 30 Tab, Refills: 2    Associated Diagnoses: Anemia during pregnancy in second trimester      PNV No12-Iron-FA-DSS-OM-3 29 mg iron-1 mg -50 mg CPKD Take  by mouth. Reference my discharge instructions.     Follow-up Information     Follow up With Specialties Details Why Contact Info    Cydney Gonzales MD Family Practice On 2019 Please attend your after delivery appointment with Dr. Don Agrawal on 19 at 10:30am [address] [place] [address] 33  [phone]              Signed By:  Francy Lanier MD    Family Medicine Resident

## 2019-10-26 NOTE — LACTATION NOTE
This note was copied from a baby's chart. Mother resting in bed. Mother in visible of pain. Discharge info reviewed briefly with mother. Mothers nurse notified of pts c/o pain. Chart shows numerous feedings, void, stool WDL. Importance of monitoring outputs and feedings on first week Breastfeeding log and follow up with pediatrician visit for weight check in 1-2 days reviewed. Encouraged to call warm line number for any questions/problems that arise. Pt will successfully establish breastfeeding by feeding in response to early feeding cues or wake every 3h, will obtain deep latch, and will keep log of feedings/output. Taught to BF at hunger cues and or q 2-3 hrs and to offer 10-20 drops of hand expressed colostrum at any non-feeds.       Breast Assessment  Left Breast: Medium  Left Nipple: Everted, Intact  Right Breast: Medium  Right Nipple: Everted, Intact  Breast- Feeding Assessment  Attends Breast-Feeding Classes: No  Breast-Feeding Experience: Yes(3rd baby to BF)  Breast Trauma/Surgery: Yes  Type/Quality: Good  Lactation Consultant Visits  Breast-Feedings: Good   Mother/Infant Observation  Mother Observation: Breast comfortable, Recognizes feeding cues  LATCH Documentation  Latch: (did not see at breast)

## 2019-10-26 NOTE — DISCHARGE INSTRUCTIONS
Patient Education        Parto por cesárea: Vincent James en el hogar - [  Section: What to Expect at [facility] ]  Hernandez recuperación    Un parto por cesárea, o simplemente cesárea, es raimundo cirugía mediante la cual se da a gaurav a un bebé a través de un jelani, llamado incisión, que hace el médico en la parte baja del abdomen y [place]. Es posible que sienta dolor en la parte baja del abdomen y que necesite analgésicos (medicamentos para el dolor) yola 1 o 2 semanas. Puede esperar tener algo de sangrado vaginal yola varias semanas. Es probable que necesite unas 6 semanas para recuperarse completamente. Es importante que se tome las cosas con calma mientras makayla la incisión. Evite levantar objetos pesados, hacer actividades vigorosas o hacer ejercicios que pudieran esforzar los músculos abdominales mientras se recupera. [place]dale a un familiar o amigo que la ayude con las tareas domésticas, la comida y las compras. Esta hoja de [employer] idea general del tiempo que le llevará recuperarse. Sin embargo, cada persona se recupera a un ritmo diferente. Siga los pasos que se mencionan a continuación para recuperarse lo más rápido posible. ¿Cómo puede cuidarse en el [facility]? Actividad    · Descanse cuando se sienta cansada. Dormir lo suficiente la ayudará a recuperarse.     · Intente caminar todos los días. Comience caminando un poco más de lo que caminó el día anterior. Poco a poco, aumente la distancia. Caminar mejora el flujo de jerrod y [place] a prevenir la neumonía, el estreñimiento y los coágulos de jerrod.     · Evite las actividades intensas, christel montar en [place], trotar, levantar pesas y hacer ejercicios aeróbicos yola 6 semanas o hasta que hernandez médico lo apruebe.     · Hasta que hernandez médico lo apruebe, no levante nada más pesado que hernandez bebé.      · No jeromy abdominales ni ningún otro ejercicio que [employer] músculos del abdomen yola 6 semanas o hasta que hernandez médico lo apruebe.     · Lilly Shearer almohada sobre la incisión al toser o respirar profundamente. Lerry [place] apoyo a busch abdomen y reducirá el dolor.     · Puede ducharse christel de costumbre. Seque la incisión con toques suaves de toalla cuando termine.     · Tendrá algo de sangrado vaginal. Use toallas sanitarias. No se jeromy lavados vaginales ni use tampones hasta que el médico se lo permita.     · Pregúntele a busch médico cuándo puede volver a conducir.     · Es probable que necesite ausentarse del trabajo por lo menos 6 semanas. [place] dependerá del tipo de trabajo que jeromy y de cómo se sienta.     · Pregúntele a busch médico cuándo puede tener relaciones sexuales. Alimentación    · Puede continuar con busch dieta normal. Si tiene malestar estomacal, coma alimentos suaves bajos en grasa, christel arroz sin condimentar, celia a la steff, pan connor y yogur.     · Sharda abundantes líquidos (a menos que busch médico le indique lo contrario).     · Podría notar que no evacua el intestino con regularidad zenaida después de la cirugía. [place] es común. Trate de evitar el estreñimiento y no hacer esfuerzos cuando evacua el intestino. Christopher vez desee kacy un suplemento de Verdezyne. Si no ha evacuado el intestino después de un par de días, pregúntele a busch médico si puede kacy un laxante suave.     · Si está amamantando, limite el alcohol. El alcohol puede causar falta de energía y otros problemas de jory para el bebé cuando raimundo soalnge que [place]. Irma Luciana ser un obstáculo en la capacidad de raimundo mamá para alimentar a busch bebé o para cuidarlo en otros aspectos. No hay mucha investigación sobre qué cantidad exacta de alcohol puede ser perjudicial para un bebé. No consumir alcohol es la opción más crow para busch bebé. Si opta por beber Marnette Moots bebida alcohólica de vez en cuando, solo tome raimundo bebida y limite las ocasiones en que sharda alcohol.  Después de beber alcohol, espere al menos 2 horas antes de amamantar para reducir la cantidad de alcohol que el bebé pueda recibir a través de [address] Medicamentos    · Busch médico le dirá si puede volver a kacy kolby medicamentos y cuándo puede volver a hacerlo. También le dará indicaciones sobre cualquier medicamento nuevo que deba kacy usted.     · Si milan medicamentos que previenen la formación de coágulos de [place], christel warfarina (Coumadin), clopidogrel (Plavix) o aspirina, asegúrese de hablar con busch médico. Él o tiff le dirá si debe volver a kacy estos medicamentos y en qué momento. Asegúrese de que entiende exactamente lo que el médico quiere que jeromy.     · [place] los analgésicos (medicamentos para el dolor) exactamente christel le fueron indicados. ? Si el médico le recetó un analgésico, tómelo según las indicaciones. ? Si no está tomando un analgésico recetado, pregúntele a busch médico si puede kacy aneesh de [employer].     · Si le parece que el analgésico le está produciendo malestar estomacal:  ? [place] el medicamento después de las comidas (a menos que busch médico le haya indicado lo contrario). ? [place]ídale al médico un analgésico diferente.     · Si busch médico le recetó antibióticos, tómelos según las indicaciones. No deje de tomarlos por el hecho de sentirse mejor. Debe kacy todos los antibióticos hasta terminarlos. Cuidado de la incisión    · Si tiene tiras de cinta [employer] incisión, déjeselas puestas yola raimundo semana o hasta que se caigan por sí solas.     · Lave la alyse a diario con agua jabonosa tibia y séquela con toques suaves de toalla. No use peróxido de hidrógeno [place]) o alcohol porque pueden retrasar la sanación. Podría cubrir la alyse con raimundo venda de gasa si supura o roza contra la ropa. Cambie la venda todos los kori.     · Mantenga la alyse limpia y [place] Pafos. Otras instrucciones    · Si amamanta a busch bebé, nilesh vez le resulte más cómodo, yola el proceso de sanación, sostener al bebé de [place] [employer] en la que no se apoye en busch abdomen.  Intente poner a busch bebé debajo del brazo, con el cuerpo del lado que lo Leonie Simin a amamantar. Sostenga la parte superior del cuerpo de busch bebé con busch Ruthanna Huy. Con lucy mano usted puede controlar la messi de busch bebé para acercarle la boca a busch seno. [place] se conoce a veces christel \"posición de balón de fútbol americano\". La atención de seguimiento es raimundo parte clave de busch tratamiento y seguridad. Asegúrese de hacer y acudir a todas las citas, y llame a busch médico si está teniendo problemas. También es raimundo buena idea saber los resultados de kolby exámenes y mantener raimundo lista de los medicamentos que milan. ¿Cuándo debe pedir ayuda? Llame al 911 en cualquier momento que considere que necesita atención de [place]. Por ejemplo, llame si:    · Tiene pensamientos de herirse a sí misma, hacerle daño a busch bebé o a otra persona.     · Se desmayó (perdió el conocimiento).     · Tiene dolor en el pecho, le falta el aire o tose jerrod.     · Tiene convulsiones.    Llame a busch médico ahora mismo o busque atención médica inmediata si:    · Tiene dolor que no mejora después de kacy un analgésico (medicamento para el dolor).     · Tiene sangrado vaginal intenso.     · Está mareada o aturdida, o siente christel si estuviera por desmayarse.     · Tiene un dolor nuevo o peor en el abdomen o la pelvis.     · Tiene puntos de sutura flojos o se le abre la incisión.     · Tiene síntomas de infección, christel:  ? Aumento del dolor, la hinchazón, la temperatura o el enrojecimiento. ? Vetas rojizas que salen de la incisión. ? Pus que sale de la incisión. ? Theodor Sabianist.     · Tiene síntomas de un coágulo de jerrod en la pierna (que se llama trombosis venosa profunda), christel:  ? Dolor en la pantorrilla, el muslo, la sheryl o detrás de la rodilla. ? Enrojecimiento e hinchazón en la pierna o la sheryl.     · Tiene señales de preeclampsia, christel:  ? Hinchazón repentina de la delma, las mani o los pies. ? Nuevos problemas de visión (christel oscurecimiento, angel borroso o angel puntos). ? Dolor de messi intenso.  Preste especial atención a los cambios en busch jory y asegúrese de comunicarse con busch médico si:    · No mejora christel se esperaba. ¿Dónde puede encontrar más información en inglés? Les Beryl a http://bill-abhishek.info/. Joselin Rahul S149 en la búsqueda para aprender más acerca de \"Parto por cesárea: Bakari Cage en el [facility] - [  Section: [facility] at [facility] ]. \"  Revisado: [address] [place], 2019  Versión del contenido: 12.2  © 2657-5246 [employer]. Las instrucciones de cuidado fueron adaptadas bajo licencia por [employer] (which disclaims liability or warranty for this information). Si usted tiene [place] afección médica o sobre estas instrucciones, siempre pregunte a busch profesional de jory. [employer] niega toda garantía o responsabilidad por busch uso de esta información.

## 2019-11-06 ENCOUNTER — OFFICE VISIT (OUTPATIENT)
Dept: FAMILY MEDICINE CLINIC | Age: 29
End: 2019-11-06

## 2019-11-06 ENCOUNTER — ROUTINE PRENATAL (OUTPATIENT)
Dept: FAMILY MEDICINE CLINIC | Age: 29
End: 2019-11-06

## 2019-11-06 VITALS
TEMPERATURE: 98.4 F | WEIGHT: 150 LBS | OXYGEN SATURATION: 99 % | SYSTOLIC BLOOD PRESSURE: 96 MMHG | BODY MASS INDEX: 27.6 KG/M2 | HEIGHT: 62 IN | HEART RATE: 77 BPM | DIASTOLIC BLOOD PRESSURE: 64 MMHG | RESPIRATION RATE: 16 BRPM

## 2019-11-06 RX ORDER — HYDROCODONE BITARTRATE AND ACETAMINOPHEN 5; 325 MG/1; MG/1
TABLET ORAL
COMMUNITY
End: 2019-12-20

## 2019-11-06 NOTE — PROGRESS NOTES
Postpartum Note  26 y.o. female   s/p pLTCS at 40w3d for advanced labor with breech presentation presenting for postpartum visit. Patient doing well post-partum without significant complaint. Lochia: little, much less than a period  Pain: 4/10 pain at surgical site. Taking ibuprofen q8hr + Norco 5-325mg prn  Baby: doing well, has seen PCP  Sexual activity: not yet   Plan for contraception: undecided  Symptoms of depression: mood is \"okay\"   Breast/bottle: formula feeding  Support from FOB/family: yes, has been at wellness visit with baby    Vitals:    Visit Vitals  BP 96/64   Pulse 77   Temp 98.4 °F (36.9 °C) (Oral)   Resp 16   Ht 5' 1.5\" (1.562 m)   Wt 150 lb (68 kg)   SpO2 99%   BMI 27.88 kg/m²       Exam:  Patient without distress. Abdomen soft, fundus firm at level of umbilicus, nontender. Incision c/d/i with steristrips in place and mild induration on inferior aspect of incision               Lower extremities:  No edema. No palpable cords or tenderness. EPDS: 2, 0 on question 10    Assessment and Plan:    Linda Stewart is a 26 y.o.  s/p pLTCS at 40 weeks 3 days. Patient having uncomplicated post-partum course. 1. Continue routine care  2. Call clinic or make appointment for symptoms of sadness  3. Follow up in 4 weeks for 6 week postpartum visit  4.  Continue PNV while breastfeeding or for 6 months postpartum    Jalen Allred MD  [id number] [address]

## 2019-11-06 NOTE — PROGRESS NOTES
Chief Complaint   Patient presents with   [address]     1. Have you been to the ER, urgent care clinic since your last visit? Hospitalized since your last visit? Yes. [facility]. 2. Have you seen or consulted any other health care providers outside of the [address] [place] [address] since your last visit? Include any pap smears or colon screening.  No

## 2019-11-06 NOTE — PATIENT INSTRUCTIONS
Píldoras anticonceptivas combinadas: Instrucciones de cuidado - [ Combination Birth Control Pills: Care Instructions ]  Instrucciones de cuidado    Las píldoras anticonceptivas combinadas se [place] para prevenir [place]. Le suministran raimundo dosis regular de las hormonas estrógeno y progestina. Se milan raimundo píldora de hormonas todos los días para prevenir el embarazo. Las píldoras anticonceptivas vienen en paquetes. El tipo más común tiene píldoras de hormonas para 3 semanas. Algunos paquetes tienen píldoras de azúcar (que no contienen ningún tipo de hormonas) para la cuarta semana. Yola la cuarta semana, la sin hormona, tiene busch período. Después de la cuarta semana (28 días), empieza con un nuevo paquete. Algunas píldoras anticonceptivas están envasadas en diferentes formas. Por ejemplo, algunos paquetes tienen píldoras de hormonas para la cuarta semana en vez de píldoras de azúcar. Marisa hormonas yola todo el mes hace que no tenga períodos o que tenga menos períodos. Otras píldoras vienen envasadas para que usted tenga un período cada 3 meses. Busch médico le dirá qué tipo de píldoras tiene usted. La atención de seguimiento es raimundo parte clave de busch tratamiento y seguridad. Asegúrese de hacer y acudir a todas las citas, y llame a busch médico si está teniendo problemas. También es raimundo buena idea saber los resultados de kolby exámenes y mantener raimundo lista de los medicamentos que milan. ¿Cómo puede cuidarse en el hogar? ¿Cómo se mayuri las píldoras? · Siga las instrucciones de busch médico sobre cuándo empezar a [employer]. Use un método anticonceptivo de respaldo, christel un condón, o no tenga relaciones sexuales yola 7 días después de empezar con las píldoras. · [place] kolby píldoras todos los días, aproximadamente a la misma hora del día. Para facilitar esto, trate de tomarlas con algo que hace cada día, christel cepillarse los dientes. ¿Qué debe hacer si olvida marisa Jose Alberto Anya?   Michelle siempre la etiqueta para obtener instrucciones específicas o llame a busch médico. Estas son algunas pautas básicas:  · Si omitió tomarse 1 píldora de hormonas, tómesela tan pronto christel se acuerde. Pregúntele a busch médico si pudiera necesitar usar un método anticonceptivo de respaldo, christel un condón, o no tener relaciones sexuales. · Si omitió 2 o más píldoras de hormonas, tome raimundo tan pronto christel se acuerde [employer]. Luego derrick la etiqueta de las píldoras o llame a busch médico para pedirle instrucciones acerca de cómo kacy las píldoras que omitió. Utilice un método anticonceptivo de respaldo o no tenga relaciones sexuales yola 7 kori. [place] es más probable si olvida kacy más de 1 Rafa. · Si tuvo relaciones sexuales, usted puede usar un anticonceptivo de [place] para ayudar a impedir el embarazo. El método anticonceptivo de urgencia más eficaz es el DIU de cobre (colocado por un médico). Usted también puede adquirir pastillas anticonceptivas de urgencia de venta rubén en la mayoría de las [facility]. ¿Qué más necesita saber? · La píldora puede tener efectos secundarios. ? Podría saltarse períodos o tener períodos muy leves. ? Podría tener sangrado entre kolby períodos menstruales [place] Joyce). [place] suele disminuir después de 3 a 4 meses. ? Podría tener Ashly Fermin de ánimo, menos interés en el sexo o aumento de [place]. · La píldora puede reducir el acné, el sangrado abundante y los cólicos, así christel los síntomas del síndrome premenstrual.  · Consulte con busch médico antes de usar cualquier otro medicamento, incluso medicamentos de [place], vitaminas, productos herbarios y suplementos. Cuando se combinan con otros medicamentos, las hormonas anticonceptivas podrían no funcionar muy honey para prevenir el embarazo. · La píldora no protege contra las infecciones de transmisión sexual (STI, por kolby siglas en inglés), christel el herpes o el VIH/SIDA.  Si no está crow de si busch devaughn sexual pudiera Donelda Och STI, utilice un condón para protegerse de alguna enfermedad. ¿Cuándo debe pedir ayuda? Llame a busch médico ahora mismo o busque atención médica inmediata si:    · Tiene dolor abdominal intenso.     · Tiene señales de un coágulo de [place], christel:  ? Dolor en la pantorrilla, el muslo, la sheryl o detrás de la rodilla. ? Enrojecimiento e hinchazón en la pierna o la sheryl.     · Tiene visión borrosa u otros problemas de la visión.     · Tiene un dolor de messi intenso.     · Tiene graves dificultades para respirar.    Preste especial atención a los cambios en busch jory y asegúrese de comunicarse con busch médico si:    · Piensa que podría estar embarazada.     · Piensa que puede estar deprimida.     · Suellen que puede yohannes Brennan expuesta a, o tiene, raimundo infección de transmisión sexual.   ¿Dónde puede encontrar más información en inglés? Tenisha Spurling a http://bill-abhishek.info/. Aguila [place] X744 en la búsqueda para aprender más acerca de \"Píldoras anticonceptivas combinadas: Instrucciones de cuidado - [ Combination Birth Control Pills: Care Instructions ]. \"  Revisado: 29 mayo, 2019  Versión del contenido: 12.2  © 9300-3795 Healthwise, Incorporated. Las instrucciones de cuidado fueron adaptadas bajo licencia por [employer] (which disclaims liability or warranty for this information). Si usted tiene [place] afección médica o sobre estas instrucciones, siempre pregunte a busch profesional de jory. Healthwise, Incorporated niega toda garantía o responsabilidad por busch uso de esta información. El implante christel método anticonceptivo: Instrucciones de cuidado - [ Implant for Birth Control: Care Instructions ]  Instrucciones de cuidado    El implante se [place] para prevenir el embarazo. Es raimundo varilla delgada del tamaño de un cerillo (fósforo) que se inserta bajo la piel (subdérmica) en la parte interna del brazo. El implante previene el embarazo yola 3 años.  Después de que se coloca, no tiene que hacer nada más para prevenir el embarazo. La atención de seguimiento es raimundo parte clave de busch tratamiento y seguridad. Asegúrese de hacer y acudir a todas las citas, y llame a busch médico si está teniendo problemas. También es raimundo buena idea saber los resultados de kolby exámenes y mantener raimundo lista de los medicamentos que milan. ¿Cómo puede cuidarse en el hogar? ¿Cómo se [place] el implante subdérmico?  · El implante es insertado y retirado por busch médico u otro profesional de la jory capacitado. [place] se hace en el consultorio del [facility] [address] solo unos minutos. · Pregúntele a busch médico si necesita usar un método anticonceptivo de respaldo, christel un condón. Y pregunte si (y por cuánto tiempo) debiera evitar tener relaciones sexuales después de la colocación del implante. Es posible que tenga que [place], dependiendo del momento de busch ciclo en que le pongan el implante. ¿Qué más necesita saber? · El implante tiene efectos secundarios. ? Es posible que tenga cambios en busch período. Busch período podría interrumpirse. También es posible que tenga manchado o sangrado entre períodos. ? Podría tener [place] Fermin de ánimo, menos interés en el sexo o aumento de [place]. · El implante puede permanecer en busch lugar hasta 3 años para prevenir el [facility]. [place] busch médico podría hablar con usted acerca de dejarlo colocado por más tiempo. ? Si no reemplaza el implante y no [address] anticonceptivo, [place]. ? Si le retiran el implante, tendrá que encontrar otro método anticonceptivo. Si no lo hace, podría quedar embarazada. ? Aun si está usted planeando quedar embarazada, tienen que retirarle el implante. · Consulte con busch médico antes de usar cualquier otro medicamento. [place] incluye medicamentos de venta rubén, vitaminas, productos herbarios y suplementos. Las hormonas anticonceptivas podrían no funcionar tan honey para prevenir el embarazo cuando se combinan con otros medicamentos.   · El implante no protege contra las infecciones de transmisión sexual (STI, por kolby siglas en inglés), christel el herpes o el VIH/SIDA. Si no está crow de si hernandez devaughn sexual pudiera tener raimundo STI, utilice un condón para protegerse de [place] infección. ¿Cuándo debe pedir ayuda? Llame a hernandez médico ahora mismo o busque atención médica inmediata si:    · Tiene dolor abdominal intenso.    Preste especial atención a los cambios en hernandez jory y asegúrese de comunicarse con hernandez médico si:    · Piensa que podría estar embarazada.     · Tiene problemas con hernandez método anticonceptivo.     · Piensa que puede estar deprimida.     · Tiene manchado con regularidad.     · Suellen que puede yohannes estado expuesta a raimundo infección de transmisión sexual o ya tiene [place]. ¿Dónde puede encontrar más información en inglés? Valentino Fraise a http://bill-abhishek.info/. [place] S806 en la búsqueda para aprender más acerca de \"El implante christel método anticonceptivo: Instrucciones de cuidado - [ Implant for Birth Control: Care Instructions ]. \"  Revisado: 29 mayo, 2019  Versión del contenido: 12.2  © 2006-2019 Healthwise, Incorporated. Las instrucciones de cuidado fueron adaptadas bajo licencia por [employer] (which disclaims liability or warranty for this information). Si usted tiene [place] afección médica o sobre estas instrucciones, siempre pregunte a hernandez profesional de jory. Healthwise, Incorporated niega toda garantía o responsabilidad por hernandez uso de esta información. Dispositivo intrauterino (DIU) christel método anticonceptivo: Instrucciones de cuidado - [ Intrauterine Device (IUD) for Birth Control: Care Instructions ]  Instrucciones de [address] dispositivo intrauterino (DIU) se [place] para prevenir el embarazo. Es un dispositivo pequeño, de plástico y en forma de T. Hernandez médico le coloca el DIU en el [place]. Usted está utilizando un DIU hormonal o un DIU de cobre.   · Los DIU hormonales previenen el [employer] por un período de 3 a 5 años, según el DIU que se use. Raimundo vez que lo tiene, no tiene que hacer nada más para prevenir Kristine [place]. · El DIU de cobre previene el embarazo yola [address] [place] que lo tiene, no tiene que hacer nada para prevenir el embarazo. Un cordón adherido al extremo del DIU cuelga a través de la abertura del útero (llamada nataliia uterino) dentro de la vagina. Puede revisar que el DIU está en busch lugar sintiendo el cordón. El DIU generalmente se queda en el útero hasta que busch médico lo retira. La atención de seguimiento es raimundo parte clave de busch tratamiento y seguridad. Asegúrese de hacer y acudir a todas las citas, y llame a busch médico si está teniendo problemas. También es raimundo buena idea saber los resultados de kolby exámenes y mantener raimundo lista de los medicamentos que milan. ¿Cómo puede cuidarse en el hogar? ¿Cómo se [place] el DIU?  · Busch médico le coloca el DIU. [place] milan solo unos minutos y se puede hacer en el consultorio de busch médico.  · Busch médico puede hacer que palpe el cordón del DIU después de colocárselo para asegurarse de que sepa cómo se siente el cordón. · Si quiere revisar el cordón por sí misma:  ? Introduzca un dedo en la vagina y sienta el nataliia uterino, que está en la parte superior de la vagina y se siente más brandon que el aravind de la vagina (algunas mujeres dicen que se siente christel la punta de la nariz). ? Debería sentir el cordón janak de plástico saliendo de la abertura del nataliia uterino. Si no puede sentir el cordón, no siempre significa que el DIU está fuera de lugar. Algunas veces es difícil sentir el cordón o se ha metido hacia adentro en el canal cervical (lo que no la afectará). · Es posible que busch médico quiera verla de 4 a 6 semanas después de la inserción del DIU, para asegurarse de que esté en busch lugar. ¿Qué hacer si piensa que el DIU no está en busch lugar? Siempre derrick la etiqueta para obtener instrucciones específicas.  Estas son algunas pautas básicas:  · Llame a busch médico y use un método anticonceptivo de respaldo, christel un condón, o no tenga relaciones sexuales hasta que sepa que el DIU está funcionando. · Si tuvo relaciones sexuales, usted puede usar un anticonceptivo de [place] para ayudar a impedir el embarazo. El método anticonceptivo de urgencia más eficaz lo receta un médico. [place] incluye raimundo pastilla recetada. Si busch DIU ya no está en busch lugar, un médico nilesh vez pueda colocarle un DIU de cobre christel anticonceptivo de [place]. Usted también puede adquirir pastillas anticonceptivas de urgencia de venta rubén en la mayoría de las [place]. ¿Qué más necesita saber? · El DIU puede tener efectos secundarios. ? El DIU hormonal por lo general reduce el flujo menstrual y los cólicos con el paso del [place]. También puede causar [place], cambios repentinos del Brennan de ánimo y sensibilidad en los senos. ? El DIU de cobre puede causar períodos más largos y [place] abundantes. · Después de que un DIU es colocado por primera vez, usted puede experimentar algunos cólicos leves y [place] ligero yola 1 o 2 días. · El DIU no protege contra las infecciones de transmisión sexual (STI, por kolby siglas en inglés), christel el herpes o el VIH/SIDA. Si no está crow de si busch devaughn sexual pudiera tener raimundo STI, utilice un condón para protegerse de [place]. ¿Cuándo debe pedir ayuda? Llame a busch médico ahora mismo o busque atención médica inmediata si:    · Siente dolor intenso en el abdomen o en la pelvis.     · Tiene sangrado vaginal intenso.  [place] significa que empapa las toallas sanitarias o los tampones que suele usar cada [place], yola 2 o más horas.     · Tiene flujo vaginal con un olor desagradable.     · Tiene fiebre y escalofríos.     · Piensa que podría estar embarazada.    Preste especial atención a los cambios en busch jory y asegúrese de comunicarse con busch médico si:    · No puede encontrar el cordón de busch DIU, o el cordón es más corto o más jessica de lo normal.     · Tiene problemas con busch método anticonceptivo.     · Suellen que puede yohannes Brennan expuesta a, o tiene, raimundo infección de transmisión sexual.   ¿Dónde puede encontrar más información en inglés? Millie Ferrari a http://bill-abhishek.info/. Escriba X434 en la búsqueda para aprender más acerca de \"Dispositivo intrauterino (DIU) christel método anticonceptivo: Instrucciones de cuidado - [ Intrauterine Device (IUD) for Birth Control: Care Instructions ]. \"  Revisado: 29 mayo, 2019  Versión del contenido: 12.2  © 7699-4829 Healthwise, Incorporated. Las instrucciones de cuidado fueron adaptadas bajo licencia por [employer] (which disclaims liability or warranty for this information). Si usted tiene [place] afección médica o sobre estas instrucciones, siempre pregunte a busch profesional de jory. Healthwise, Incorporated niega toda garantía o responsabilidad por busch uso de esta información. Aprenda sobre métodos anticonceptivos: La inyección anticonceptiva - [ Learning About Birth Control: The Shot ]  ¿Qué es la inyección anticonceptiva? La inyección anticonceptiva se [place] para prevenir el embarazo. Esta inyección se aplica en el brazo o en las nalgas (glúteos). Mediante la inyección se le da raimundo dosis de la hormona progestina. La inyección anticonceptiva a menudo se conoce por busch nombre comercial, Depo-Provera. La progestina previene el [facility] de las siguientes formas: Espesa el moco del nataliia uterino. [place] hace que sea difícil que los espermatozoides ingresen en el [place]run[place]lin [place]. También adelgaza el revestimiento del útero, lo que dificulta que un óvulo fertilizado se adhiera al Krunal Weber. La progestina puede en ocasiones evitar que los ovarios liberen un óvulo cada mes (ovulación). La inyección brittaney protección anticonceptiva yola 3 meses por cada aplicación. Después necesitará otra inyección. La inyección anticonceptiva puede causar pérdida de masa ósea.  [address] mujeres pueden [place] de [place] crow yola hasta 2 años y luego pueden optar por cambiar a otro método anticonceptivo. Es posible que algunas mujeres puedan utilizar la inyección anticonceptiva por más de 2 Los candis. ¿Cuál es busch eficacia? En el primer año de uso:  · Cuando la inyección anticonceptiva se [place] exactamente christel se indica, menos de 1 Dorie de cada 100 tiene un embarazo no planificado. · Cuando la inyección anticonceptiva no se utiliza exactamente christel se indica, 6 mujeres de cada 100 tienen un embarazo no planificado. Asegúrese de informarle al médico si tiene algún problema de jory o si milan algún medicamento. Él puede ayudarla a elegir el método anticonceptivo que sea adecuado para usted. ¿Cuáles son las ventajas de la inyección anticonceptiva? · La inyección anticonceptiva es aneesh de los métodos anticonceptivos más eficaces. · Es práctica. Solamente necesita que le administren la inyección raimundo vez cada 3 meses para prevenir el embarazo. No tiene que [employer] sexuales para protegerse del embarazo. · Previene el embarazo yola 3 meses por cada aplicación. Usted no tiene que preocuparse de los métodos anticonceptivos yola Jon. · Es crow para utilizarse mientras se está amamantando. · La inyección anticonceptiva puede reducir el sangrado abundante y los cólicos. · La inyección anticonceptiva no contiene estrógeno. Por lo tanto, puede usarla si no quiere o no puede kacy estrógeno debido a que tiene ciertas inquietudes o problemas de [place]. ¿Cuáles son las desventajas de la inyección anticonceptiva? · La inyección anticonceptiva no protege contra las infecciones de transmisión sexual (STI, por kolby siglas en inglés), christel el herpes o el VIH/SIDA. Si no está crow de si busch devaughn sexual pudiera tener raimundo STI, utilice un condón para protegerse de cualquier enfermedad. · La inyección puede provocar pérdida de masa ósea en algunas mujeres.  Hable con busch médico acerca de los riesgos y los beneficios. · La inyección es necesaria cada 3 meses. Cualquier efecto secundario puede durar 3 meses o más tiempo. ? La inyección anticonceptiva puede causar períodos [place], o podría tener manchas de [place] períodos. También puede dejar de tener un período. Algunas mujeres sendy christel raimundo ventaja el no tener un período. ? La inyección anticonceptiva puede causar [place] de ánimo, menos interés en el sexo o aumento de [place]. · Si desea quedar embarazada, puede tardar hasta 18 meses después de que deje de administrarse la inyección. [place] se debe a que las hormonas de la inyección anticonceptiva tienen que salir de busch [place], y busch cuerpo tiene que readaptarse. ¿Dónde puede encontrar más información en [facility]? Trever Spangler a http://bill-abhishek.info/. Magdy Keenan M717 en la búsqueda para aprender más acerca de \"Aprenda sobre métodos anticonceptivos: La inyección anticonceptiva - [ Learning About Birth Control: The Shot ]. \"  Revisado: 29 mayo, 2019  Versión del contenido: 12.2  © 9383-7681 Healthwise, Incorporated. Las instrucciones de cuidado fueron adaptadas bajo licencia por [employer] (which disclaims liability or warranty for this information). Si usted tiene [place] afección médica o sobre estas instrucciones, siempre pregunte a busch profesional de jory. Healthwise, Incorporated niega toda garantía o responsabilidad por busch uso de esta información.

## 2019-12-05 ENCOUNTER — ROUTINE PRENATAL (OUTPATIENT)
Dept: FAMILY MEDICINE CLINIC | Age: 29
End: 2019-12-05

## 2019-12-05 VITALS
WEIGHT: 153 LBS | DIASTOLIC BLOOD PRESSURE: 76 MMHG | OXYGEN SATURATION: 97 % | RESPIRATION RATE: 16 BRPM | HEIGHT: 62 IN | HEART RATE: 77 BPM | TEMPERATURE: 97.6 F | SYSTOLIC BLOOD PRESSURE: 110 MMHG | BODY MASS INDEX: 28.16 KG/M2

## 2019-12-05 DIAGNOSIS — O99.013 ANEMIA DURING PREGNANCY IN THIRD TRIMESTER: ICD-10-CM

## 2019-12-05 RX ORDER — SERTRALINE HYDROCHLORIDE 25 MG/1
25 TABLET, FILM COATED ORAL DAILY
Qty: 30 TAB | Refills: 0 | Status: SHIPPED | OUTPATIENT
Start: 2019-12-05

## 2019-12-05 RX ORDER — ACETAMINOPHEN AND CODEINE PHOSPHATE 120; 12 MG/5ML; MG/5ML
1 SOLUTION ORAL DAILY
Qty: 1 PACKAGE | Refills: 0 | Status: SHIPPED | OUTPATIENT
Start: 2019-12-05 | End: 2019-12-19 | Stop reason: ALTCHOICE

## 2019-12-05 NOTE — PROGRESS NOTES
Chief Complaint   Patient presents with   [address]      6 week follow up     Other     patient states having some stomach pain. patient describes stomach pain as cramping        Blood pressure 110/76, pulse 77, temperature 97.6 °F (36.4 °C), temperature source Oral, resp. rate 16, height 5' 1.5\" (1.562 m), weight 153 lb (69.4 kg), SpO2 97 %, unknown if currently breastfeeding. 1. Have you been to the ER, urgent care clinic since your last visit? Hospitalized since your last visit? No    2. Have you seen or consulted any other health care providers outside of the [address] [place] [address] since your last visit? Include any pap smears or colon screening.  No

## 2019-12-05 NOTE — PROGRESS NOTES
Postpartum Note    26 y.o. female status post CS for breech presentation presenting for postpartum visit. History obtained with help of [facility] #[id number]    Lochia: normal  Pain: Having some pain at incision site; having diarrhea and cramping with eating sometimes  Baby: doing well, has seen PCP  Sexual activity: none  Plan for contraception: OCPs  Symptoms of depression: some sadness, no thoughts of self harm nor harm to baby  Breast/bottle: bottle  But desires to reestablish breastfeeding  Support from FOB/family: yes, father at visit today    Vitals:    Visit Vitals  /76   Pulse 77   Temp 97.6 °F (36.4 °C) (Oral)   Resp 16   Ht 5' 1.5\" (1.562 m)   Wt 153 lb (69.4 kg)   SpO2 97%   BMI 28.44 kg/m²       Exam:  Patient without distress. Abdomen soft, fundus firm at level of umbilicus, nontender. Incision clean and intact with irritation from steristrips which were removed at today's visit               Lower extremities:  No edema. No palpable cords or tenderness.  Depression Scale  In the past 7 days:  I have been able to laugh and see the funny side of things[de-identified] Not quite so much now  I have looked forward with enjoyment to things: Rather less than I used to  I have blamed myself unnecessarily when things went wrong: Yes, some of the time  I have been anxious or worried for no good reason: Yes, very often  I have felt scared or panicky for no good reason: Yes, sometimes  Things have been getting on top of me: Yes, sometimes I haven't been coping as well as usual  I have been so unhappy that I have had difficulty sleeping: Not very often  I have felt sad or miserable: Not very often  I have been so unhappy that I have been crying: Only occasionally  The thought of harming myself has occured to me: Never  Emery  Depression Scale (EPDS)  Emery  Depression Score: 14        Assessment and Plan:    Esther Weinstein is a 26 y.o.  s/p CS at 40 weeks 3days.   Patient with postpartum depression based on EPDS    1. Continue routine care  2. Anemia: continue Iron supplement  3. Intermittent Diarrhea: likely related to anxiety and depression; pepto bismol prn  4. Postpartum Depression: start Zoloft 25mg daily, referred to [facility] online support group and resources, RTC 1 week with dr Alphonso Forrest for followup of symptoms   5. Desire to Breastfeed: encouraged to have infant latch every 2-3 hours during the day to encourage milk supply   6.  Follow up for yearly well woman exam.    7. Continue PNV while breastfeeding or for 6 months postpartum                 Nick Tijerina MD  [id number] [address]

## 2019-12-05 NOTE — PATIENT INSTRUCTIONS
[place] al [phone]([id number]) Apoyo de PSI para las familias hispano parlantes [phone], #1 Llame al número de teléfono gratuito para obtener recursos, apoyo e información gratuita. Déjenos un mensaje y un voluntario le devolverá la llamada. [place] es un número gratuito al que puede llamar cualquiera para obtener información básica, [de-identified] y recursos. Jose Wilburns son contestados todos los días de la [place]. [place] puede dejar un mensaje confidencial y [facility] voluntaria le contestará tan pronto christel sea posible. Si usted no puede hablar cuando la voluntaria le devuelva la llamada, pueden encontrar otra hora para hablar. La voluntaria le dará información, [de-identified] y nombres de recursos cerca de usted. [place] al [phone] (línea gratuita) [facility] de [id number] Presiona # 45 para 107 [facility] Cada miércoles 2:30pm [place] / 1:30pm [place] / 12:30pm [place] / 11:30am [facility] / 10:30am [place] / 9:30 am Juani Nuestros grupos de apoyo social en linea o por teléfono están aquí para ayudarle a conectar con otros padres, hablar sobre kolby experiencias, y para aprender acerca de herramientas y recursos útiles. Ya sea que esté pasando por estrés, ajustándose a la crianza de kolby hijos, depresión o ansiedad yola el [facility] o [place], nuestros grupos están aquí para ayudarle. Los grupos son dirigidos por líderes de butch con experiencia y que entienden las dificultades emocionales de nuevos padres. Por favor avísenos si tiene [place] pregunta y acompáñenos esta semana para un butch informativo y de apoyo. Barbara Stage de los [place] son bienvenidos. Envíe un correo electrónico a [email or web address] para obtener el número de teléfono correcto para busch país. Roland clic aquí para obtener Diana Ponto en Español Píldoras anticonceptivas combinadas: Instrucciones de cuidado - [ Combination Birth Control Pills: Care Instructions ] Instrucciones de cuidado Las píldoras anticonceptivas combinadas se [place] para prevenir Carylon Rudi. Le suministran raimundo dosis regular de las hormonas estrógeno y progestina. Se milan raimundo píldora de hormonas todos los días para prevenir el embarazo. Las píldoras anticonceptivas vienen en paquetes. El tipo más común tiene píldoras de hormonas para 3 semanas. Algunos paquetes tienen píldoras de azúcar (que no contienen ningún tipo de hormonas) para la cuarta semana. Yola la cuarta semana, la sin hormona, tiene busch período. Después de la cuarta semana (28 días), empieza con un nuevo paquete. Algunas píldoras anticonceptivas están envasadas en diferentes formas. Por ejemplo, algunos paquetes tienen píldoras de hormonas para la cuarta semana en vez de píldoras de azúcar. Marisa hormonas yola todo el mes hace que no tenga períodos o que tenga menos períodos. Otras píldoras vienen envasadas para que usted tenga un período cada 3 meses. Busch médico le dirá qué tipo de píldoras tiene usted. La atención de seguimiento es raimundo parte clave de busch tratamiento y seguridad. Asegúrese de hacer y acudir a todas las citas, y llame a busch médico si está teniendo problemas. También es raimundo buena idea saber los resultados de kolby exámenes y mantener raimundo lista de los medicamentos que milan. Cómo puede cuidarse en el hogar? Cómo se mayuri las píldoras? · Siga las instrucciones de busch médico sobre cuándo empezar a [employer]. Use un método anticonceptivo de respaldo, christel un condón, o no tenga relaciones sexuales oyla 7 días después de empezar con las píldoras. · Anita kolby píldoras todos los días, aproximadamente a la misma hora del día. Para facilitar esto, trate de tomarlas con algo que hace cada día, christel cepillarse los dientes. Qué debe hacer si olvida marisa Gaylan Cedric? Michelle siempre la etiqueta para obtener instrucciones específicas o llame a busch médico. Estas son algunas pautas básicas: · Si omitió tomarse 1 píldora de hormonas, tómesela tan pronto christel se acuerde. Pregúntele a busch médico si pudiera necesitar usar un método anticonceptivo de respaldo, christel un condón, o no tener relaciones sexuales. · Si omitió 2 o más píldoras de hormonas, tome raimundo tan pronto christel se acuerde [employer]. Luego derrick la etiqueta de las píldoras o llame a busch médico para pedirle instrucciones acerca de cómo kacy las píldoras que omitió. Utilice un método anticonceptivo de respaldo o no tenga relaciones sexuales yola 7 kori. [place] es más probable si olvida kacy más de 1 Rafa. · Si tuvo relaciones sexuales, usted puede usar un anticonceptivo de [place] para ayudar a impedir el embarazo. El método anticonceptivo de urgencia más eficaz es el DIU de cobre (colocado por un médico). Usted también puede adquirir pastillas anticonceptivas de urgencia de venta rubén en la mayoría de las [facility]. Jatinder Bai necesita saber? · La píldora puede tener efectos secundarios. ? Podría saltarse períodos o tener períodos muy leves. ? Podría tener sangrado entre kolby períodos menstruales Johnson Joyce). [place] suele disminuir después de 3 a 4 meses. ? Podría tener [place] de ánimo, menos interés en el sexo o aumento de [place]. · La píldora puede reducir el acné, el sangrado abundante y los cólicos, así christel los síntomas del síndrome premenstrual. 
· Consulte con busch médico antes de usar cualquier otro medicamento, incluso medicamentos de [place], vitaminas, productos herbarios y suplementos. Cuando se combinan con otros medicamentos, las hormonas anticonceptivas podrían no funcionar muy honey para prevenir el embarazo. · La píldora no protege contra las infecciones de transmisión sexual (STI, por kolby siglas en inglés), christel el herpes o el VIH/SIDA. Si no está crow de si busch devaughn sexual pudiera tener raimundo STI, utilice un condón para protegerse de [place] enfermedad. Cuándo debe pedir ayuda? Llame a busch médico ahora mismo o busque atención médica inmediata si: 
  · Tiene dolor abdominal intenso.  
  · Tiene señales de un coágulo de [place], christel: ? Dolor en la pantorrilla, el muslo, la sheryl o detrás de la rodilla. ? Enrojecimiento e hinchazón en la pierna o la sheryl.  
  · Tiene visión borrosa u otros problemas de la visión.  
  · Tiene un dolor de messi intenso.  
  · Tiene graves dificultades para respirar.  
 Preste especial atención a los cambios en busch jory y asegúrese de comunicarse con busch médico si: 
  · Piensa que podría estar embarazada.  
  · Piensa que puede estar deprimida.  
  · Suellen que puede yohannes Brennan expuesta a, o tiene, raimundo infección de transmisión sexual.  

Dónde puede encontrar más información en inglés? Raudel Read a http://bill-abhishek.info/. Niraj Ring C932 en la búsqueda para aprender más acerca de \"Píldoras anticonceptivas combinadas: Instrucciones de cuidado - [ Combination Birth Control Pills: Care Instructions ]. \" 
Revisado: 29 mayo, 2019 Versión del contenido: 12.2 © 0335-3016 Healthwise, Incorporated. Las instrucciones de cuidado fueron adaptadas bajo licencia por Good [employer]ctions (which disclaims liability or warranty for this information). Si usted tiene [place] Rector afección médica o sobre estas instrucciones, siempre pregunte a busch profesional de jory. Healthwise, Incorporated niega toda garantía o responsabilidad por busch uso de esta información. Sertralina (Por la boca) Trata la depresión, el trastorno obsesivo-compulsivo (MADHU), el trastorno por estrés postraumático (TEPT), el trastorno disfórico premenstrual (TDPM), el trastorno de ansiedad social y el trastorno del pánico. Tanya medicamento es un ISRS. Adrienne(s) : Zoloft Existen muchas otras marcas de [place]. Tanya medicamento no debe ser usado cuando:  
Tanya medicamento no es adecuado para todas las personas.  No use tanya medicamento si alguna vez ha tenido raimundo reacción alérgica a la sertralina. [place] de usar tanya medicamento:  
Derl Ply · [place] kolby medicamentos christel se le haya indicado. Es probable que sea necesario cambiar busch dosis varias veces hasta encontrar la que funciona mejor para usted. Es probable que necesite kacy tanya medicamento por varias semanas o meses antes de que empiece a sentirse mejor. · Suspensión oral: Use el gotero provisto para retirar el medicamento y mezclarlo con 1/2 taza (4 onzas) de agua, refresco de jengibre o lima-santos, [place] o jugo de naranja. Tómese la mezcla inmediatamente. Es normal que el líquido se rachell turbio. · [facility] debe venir con [facility] del medicamento. Solicite raimundo copia con busch farmacéutico en tin de no tener la guía. · Si olvida raimundo dosis: Si olvida raimundo dosis de busch medicamento, tómelo lo más pronto posible. Si es ben la hora para busch próxima dosis, espere hasta entonces para kacy busch dosis regular. No use medicamento adicional para reponer la dosis olvidada. · Guarde el medicamento en un recipiente cerrado a temperatura ambiente y alejado del calor, la humedad y la gaurav directa. Medicamentos y [employer] que debe evitar:  
Consulte con busch médico o farmacéutico antes de usar cualquier medicamento, incluyendo los que compra sin receta médica, las vitaminas y los productos herbales. · No use tanya medicamento junto con pimozida. No use tanya medicamento junto con un inhibidor de la monoamino oxidasa (IMAO) sin dejar que transcurran 14 días entre un medicamento y el otro. No use la solución oral de sertralina si usted también está usando disulfiram. 
· Algunos medicamentos pueden afectar la manera que funciona la sertralina. Infórmele a busch médico si usted [facility] siguientes:  
¨ Buspirona, cimetidina, cisaprida, diazepam, digitoxina, fentanilo, flecainida, litio, fenitoína, propafenona, hierba de [address], tramadol, suplementos de triptofano o valproato ¨ Un anticoagulante (christel warfarina), un diurético, un medicamento REUBEN para el dolor o la artritis (christel aspirina, diclofenac, ibuprofeno), un antidepresivo tricíclico, un medicamento triptano para la migraña · No consuma alcohol mientras esté . Precauciones yola el uso de [place] medicamento: · Infórmele a busch médico si usted está embarazada o dando de lactar, o si usted tiene enfermedad en el hígado, problemas de sangrado, glaucoma, enfermedad cardíaca o un trastorno convulsivo. · Para algunos niños, adolescentes o adultos jóvenes, [place] medicamento podría aumentar los problemas mentales o emocionales. [place] puede llevar a pensamientos de suicidio y violencia. Hable con busch médico inmediatamente si usted tiene cualquier cambio de pensamientos o comportamiento que le preocupe. Informe a busch médico si usted o algún familiar ha tenido un historial de un trastorno bipolar o intentos de suicidio. · [place] medicamento puede provocarle los siguientes problemas: ¨ El síndrome de serotonina (cuando se milan con ciertos medicamentos) ¨ Niveles bajos de sodio (más común en pacientes en edad avanzada y los que mayuri diuréticos o sufren de deshidratación) · Infórmele a busch médico si usted es sensible al látex, porque la  solución oral viene con un gotero de látex. · [place] medicamento podría causarle a usted mareos o somnolencia. No maneje ni jeromy otra tarea que pueda ser peligrosa hasta que sepa cómo le afecta [place] medicamento. · No suspenda el uso de [place] medicamento súbitamente. Busch médico necesitará disminuir busch dosis poco a poco antes de suspender el medicamento por completo. · Busch médico tendrá que revisar busch progreso y los efectos de [place] medicamento yola kolby citas regulares. Cumpla sin falta con todas kolby citas médicas. Asista a todas kolby citas. · Guarde todos los medicamentos fuera del alcance de los niños. [place] comparta kolby medicamentos con [facility]. Efectos secundarios que pueden presentarse yola el uso de tanya medicamento:  
Consulte inmediatamente con el médico si nota cualquiera de estos efectos secundarios: 
· Reacción alérgica: Comezón o ronchas, hinchazón del magali o las mani, hinchazón u hormigueo en la boca o garganta, opresión en el pecho, dificultad para respirar · Ansiedad, inquietud, ritmo cardíaco acelerado, fiebre, transpiración, espasmos musculares, estremecimientos, náusea, vómito, diarrea, angel o escuchar cosas inexistentes · Ampollas, despelleje, o sarpullido mcgovern en la piel · Confusión, debilidad y contracciones musculares · Dolor de ojos, cambios de la visión, angel halos aldennise Hernandez · Sentirse más emocionado o energético que lo usual 
· Pensamientos de [place] daño a sí mismo o a otros, comportamiento inusual 
· Sangrado o moretones inusuales Consulte con el médico si nota los siguientes efectos secundarios menos graves:  
· La boca seca · Pérdida del apetito, pérdida de peso · Diarrea, estreñimiento, náuseas, vómitos leves · Problemas sexuales · Somnolencia, dificultad para dormir Consulte con el médico si nota otros efectos secundarios que jyoti son causados por tanya medicamento. Llame a busch médico para consultarle Hemanth. Usted puede notificar kolby efectos secundarios al FDA al 1-817-AYR-2051. © 2017 [facility][note 2] Information is for End User's use only and may not be sold, redistributed or otherwise used for commercial purposes. Esta información es sólo para uso en educación. Busch intención no es darle un consejo médico sobre enfermedades o tratamientos. Colsulte con busch Patti Dauer farmacéutico antes de seguir cualquier régimen médico para saber si es seguro y efectivo para usted.

## 2019-12-19 ENCOUNTER — ROUTINE PRENATAL (OUTPATIENT)
Dept: FAMILY MEDICINE CLINIC | Age: 29
End: 2019-12-19

## 2019-12-19 VITALS
RESPIRATION RATE: 16 BRPM | HEIGHT: 62 IN | BODY MASS INDEX: 28.34 KG/M2 | DIASTOLIC BLOOD PRESSURE: 65 MMHG | OXYGEN SATURATION: 98 % | TEMPERATURE: 97.9 F | SYSTOLIC BLOOD PRESSURE: 101 MMHG | WEIGHT: 154 LBS | HEART RATE: 67 BPM

## 2019-12-19 DIAGNOSIS — N94.6 DYSMENORRHEA: ICD-10-CM

## 2019-12-19 DIAGNOSIS — F32.0 CURRENT MILD EPISODE OF MAJOR DEPRESSIVE DISORDER WITHOUT PRIOR EPISODE (HCC): Primary | ICD-10-CM

## 2019-12-19 RX ORDER — NORGESTIMATE AND ETHINYL ESTRADIOL 0.25-0.035
1 KIT ORAL DAILY
Qty: 1 DOSE PACK | Refills: 11 | Status: SHIPPED | OUTPATIENT
Start: 2019-12-19

## 2019-12-19 NOTE — PROGRESS NOTES
Postpartum Note    26 y.o. female presenting for followup of postpartum depression. Was taking zoloft for less than 3 weeks. Discontinued 2 days ago. Has made peace with bottlefeeding after finding a friend who reinforced her decision and made her feel at ease about bottlefeeding. Has been spending more time with her daughter and feels close to her. Denies thoughts of self harm and harm to baby. Denies intrusive thoughts. Has not started OCPs and now that she is not breastfeeding desires combined pill for hx of painful periods. Vitals:    Visit Vitals  /65   Pulse 67   Temp 97.9 °F (36.6 °C) (Oral)   Resp 16   Ht 5' 1.5\" (1.562 m)   Wt 154 lb (69.9 kg)   SpO2 98%   BMI 28.63 kg/m²       Exam:  Patient without distress. Cardio: RRR without murmur   Pulm: CTAB without w/r/r    Abdomen soft, fundus firm at level of umbilicus, nontender. Lower extremities:  No edema. No palpable cords or tenderness. Incision appears well healing without erythema, drainage nor warmth   Mood is \"good\" and affect appropriate - smiling and engaging with provider and infant    EPDS: 2 with 0 on question 10    Assessment and Plan:    Esther Weinstein is a 26 y.o.  s/p CS at 40 weeks 3days. Improved mood today. Spent significant time counseling patient about benefits of staying on SSRI however she declines. Has made peace with bottlefeeding her daughter and has good support for her parenting choices from new friends whom she trusts and can be open and honest with. 1. RTC for and feelings of sadness, anxiety, depression  2. Continue to meet with friends who support [parenting choices  3.  START Sprintec for history dysmenorrhea                   Brett Reynoso MD  [address] [place] [address]

## 2019-12-19 NOTE — PATIENT INSTRUCTIONS
Píldoras anticonceptivas combinadas: Instrucciones de cuidado - [ Combination Birth Control Pills: Care Instructions ] Instrucciones de cuidado Las píldoras anticonceptivas combinadas se [place] para prevenir [place] [employer]. Le suministran raimundo dosis regular de las hormonas estrógeno y progestina. Se milan raimundo píldora de hormonas todos los días para prevenir el embarazo. Las píldoras anticonceptivas vienen en paquetes. El tipo más común tiene píldoras de hormonas para 3 semanas. Algunos paquetes tienen píldoras de azúcar (que no contienen ningún tipo de hormonas) para la cuarta semana. Yola la cuarta semana, la sin hormona, tiene busch período. Después de la cuarta semana (28 días), empieza con un nuevo paquete. Algunas píldoras anticonceptivas están envasadas en diferentes formas. Por ejemplo, algunos paquetes tienen píldoras de hormonas para la cuarta semana en vez de píldoras de azúcar. Marisa hormonas yola todo el mes hace que no tenga períodos o que tenga menos períodos. Otras píldoras vienen envasadas para que usted tenga un período cada 3 meses. Busch médico le dirá qué tipo de píldoras tiene usted. La atención de seguimiento es raimudno parte clave de busch tratamiento y seguridad. Asegúrese de hacer y acudir a todas las citas, y llame a busch médico si está teniendo problemas. También es raimundo buena idea saber los resultados de kolby exámenes y mantener raimundo lista de los medicamentos que milan. Cómo puede cuidarse en el hogar? Cómo se mayuri las píldoras? · Siga las instrucciones de busch médico sobre cuándo empezar a [employer]. Use un método anticonceptivo de respaldo, christel un condón, o no tenga relaciones sexuales yola 7 días después de empezar con las píldoras. · [place] kolby píldoras todos los días, aproximadamente a la misma hora del día. Para facilitar esto, trate de tomarlas con algo que hace cada día, christel cepillarse los dientes. Qué debe hacer si olvida marisa Ovidio Hopkins? Michelle siempre la etiqueta para obtener instrucciones específicas o llame a busch médico. Estas son algunas pautas básicas: · Si omitió tomarse 1 píldora de hormonas, tómesela tan pronto christel se acuerde. Pregúntele a busch médico si pudiera necesitar usar un método anticonceptivo de respaldo, christel un condón, o no tener relaciones sexuales. · Si omitió 2 o más píldoras de hormonas, tome raimundo tan pronto christel se acuerde [employer]. Luego michelle la etiqueta de las píldoras o llame a busch médico para pedirle instrucciones acerca de cómo kacy las píldoras que omitió. Utilice un método anticonceptivo de respaldo o no tenga relaciones sexuales yola 7 kori. [place] es más probable si olvida kacy más de 1 Rafa. · Si tuvo relaciones sexuales, usted puede usar un anticonceptivo de [place] para ayudar a impedir el embarazo. El método anticonceptivo de urgencia más eficaz es el DIU de cobre (colocado por un médico). Usted también puede adquirir pastillas anticonceptivas de urgencia de venta rubén en la mayoría de las [facility]. Jasmyn Barrow necesita saber? · La píldora puede tener efectos secundarios. ? Podría saltarse períodos o tener períodos muy leves. ? Podría tener sangrado entre kolby períodos menstruales Johnson Joyce). [place] suele disminuir después de 3 a 4 meses. ? Podría tener Ash[place] Fermin de ánimo, menos interés en el sexo o aumento de [place]. · La píldora puede reducir el acné, el sangrado abundante y los cólicos, así christel los síntomas del síndrome premenstrual. 
· Consulte con busch médico antes de usar cualquier otro medicamento, incluso medicamentos de [place], vitaminas, productos herbarios y suplementos. Cuando se combinan con otros medicamentos, las hormonas anticonceptivas podrían no funcionar muy honey para prevenir el embarazo. · La píldora no protege contra las infecciones de transmisión sexual (STI, por kolby siglas en inglés), christel el herpes o el VIH/SIDA.  Si no está crow de si busch devaughn sexual pudiera tener raimundo STI, utilice un condón para protegerse de [place] enfermedad. Cuándo debe pedir ayuda? Llame a busch médico ahora mismo o busque atención médica inmediata si: 
  · Tiene dolor abdominal intenso.  
  · Tiene señales de un coágulo de [place], christel: ? Dolor en la pantorrilla, el muslo, la sheryl o detrás de la rodilla. ? Enrojecimiento e hinchazón en la pierna o la sheryl.  
  · Tiene visión borrosa u otros problemas de la visión.  
  · Tiene un dolor de messi intenso.  
  · Tiene graves dificultades para respirar.  
 Preste especial atención a los cambios en busch jory y asegúrese de comunicarse con busch médico si: 
  · Piensa que podría estar embarazada.  
  · Piensa que puede estar deprimida.  
  · Suellen que puede yohannes Brennan expuesta a, o tiene, raimundo infección de transmisión sexual.  

Dónde puede encontrar más información en inglés? Melina Anaya a http://bill-abhishek.info/. Milbert Blizzard O139 en la búsqueda para aprender más acerca de \"Píldoras anticonceptivas combinadas: Instrucciones de cuidado - [ Combination Birth Control Pills: Care Instructions ]. \" 
Revisado: 29 [place], 2019 Versión del contenido: 12.2 © 3809-3879 Healthwise, Incorporated. Las instrucciones de cuidado fueron adaptadas bajo licencia por [employer] (which disclaims liability or warranty for this information). Si usted tiene [place] afección médica o sobre estas instrucciones, siempre pregunte a busch profesional de jory. Healthwise, Incorporated niega toda garantía o responsabilidad por busch uso de esta información.

## 2019-12-19 NOTE — PROGRESS NOTES
Chief Complaint   Patient presents with    Post-Partum Care     depression     1. Have you been to the ER, urgent care clinic since your last visit? Hospitalized since your last visit? No    2. Have you seen or consulted any other health care providers outside of the [address] [place] [address] since your last visit? Include any pap smears or colon screening.  No

## 2020-06-10 ENCOUNTER — APPOINTMENT (OUTPATIENT)
Dept: GENERAL RADIOLOGY | Age: 30
End: 2020-06-10
Attending: STUDENT IN AN ORGANIZED HEALTH CARE EDUCATION/TRAINING PROGRAM
Payer: SUBSIDIZED

## 2020-06-10 ENCOUNTER — HOSPITAL ENCOUNTER (EMERGENCY)
Age: 30
Discharge: HOME | End: 2020-06-10
Attending: STUDENT IN AN ORGANIZED HEALTH CARE EDUCATION/TRAINING PROGRAM
Payer: SUBSIDIZED

## 2020-06-10 VITALS
DIASTOLIC BLOOD PRESSURE: 80 MMHG | OXYGEN SATURATION: 97 % | HEIGHT: 61 IN | BODY MASS INDEX: 29.07 KG/M2 | RESPIRATION RATE: 22 BRPM | TEMPERATURE: 98.4 F | WEIGHT: 154 LBS | HEART RATE: 79 BPM | SYSTOLIC BLOOD PRESSURE: 118 MMHG

## 2020-06-10 DIAGNOSIS — Z20.822 SUSPECTED 2019 NOVEL CORONAVIRUS INFECTION: Primary | ICD-10-CM

## 2020-06-10 LAB
ALBUMIN SERPL-MCNC: 3.2 G/DL (ref 3.5–5)
ALBUMIN/GLOB SERPL: 0.7 {RATIO} (ref 1.1–2.2)
ALP SERPL-CCNC: 150 U/L (ref 45–117)
ALT SERPL-CCNC: 39 U/L (ref 12–78)
ANION GAP SERPL CALC-SCNC: 7 MMOL/L (ref 5–15)
AST SERPL-CCNC: 31 U/L (ref 15–37)
ATRIAL RATE: 90 BPM
BASOPHILS # BLD: 0 K/UL (ref 0–0.1)
BASOPHILS NFR BLD: 0 % (ref 0–1)
BILIRUB SERPL-MCNC: 0.2 MG/DL (ref 0.2–1)
BUN SERPL-MCNC: 8 MG/DL (ref 6–20)
BUN/CREAT SERPL: 13 (ref 12–20)
CALCIUM SERPL-MCNC: 8.6 MG/DL (ref 8.5–10.1)
CALCULATED P AXIS, ECG09: 41 DEGREES
CALCULATED R AXIS, ECG10: 45 DEGREES
CALCULATED T AXIS, ECG11: 46 DEGREES
CHLORIDE SERPL-SCNC: 108 MMOL/L (ref 97–108)
CO2 SERPL-SCNC: 24 MMOL/L (ref 21–32)
COMMENT, HOLDF: NORMAL
CREAT SERPL-MCNC: 0.64 MG/DL (ref 0.55–1.02)
DIAGNOSIS, 93000: NORMAL
DIFFERENTIAL METHOD BLD: ABNORMAL
EOSINOPHIL # BLD: 0 K/UL (ref 0–0.4)
EOSINOPHIL NFR BLD: 1 % (ref 0–7)
ERYTHROCYTE [DISTWIDTH] IN BLOOD BY AUTOMATED COUNT: 13.9 % (ref 11.5–14.5)
GLOBULIN SER CALC-MCNC: 4.7 G/DL (ref 2–4)
GLUCOSE SERPL-MCNC: 107 MG/DL (ref 65–100)
HCT VFR BLD AUTO: 37.9 % (ref 35–47)
HGB BLD-MCNC: 11.7 G/DL (ref 11.5–16)
IMM GRANULOCYTES # BLD AUTO: 0 K/UL (ref 0–0.04)
IMM GRANULOCYTES NFR BLD AUTO: 0 % (ref 0–0.5)
LYMPHOCYTES # BLD: 2 K/UL (ref 0.8–3.5)
LYMPHOCYTES NFR BLD: 35 % (ref 12–49)
MCH RBC QN AUTO: 23.6 PG (ref 26–34)
MCHC RBC AUTO-ENTMCNC: 30.9 G/DL (ref 30–36.5)
MCV RBC AUTO: 76.4 FL (ref 80–99)
MONOCYTES # BLD: 0.4 K/UL (ref 0–1)
MONOCYTES NFR BLD: 6 % (ref 5–13)
NEUTS SEG # BLD: 3.4 K/UL (ref 1.8–8)
NEUTS SEG NFR BLD: 58 % (ref 32–75)
NRBC # BLD: 0 K/UL (ref 0–0.01)
NRBC BLD-RTO: 0 PER 100 WBC
P-R INTERVAL, ECG05: 132 MS
PLATELET # BLD AUTO: 247 K/UL (ref 150–400)
PMV BLD AUTO: 10.7 FL (ref 8.9–12.9)
POTASSIUM SERPL-SCNC: 3.3 MMOL/L (ref 3.5–5.1)
PROT SERPL-MCNC: 7.9 G/DL (ref 6.4–8.2)
Q-T INTERVAL, ECG07: 372 MS
QRS DURATION, ECG06: 84 MS
QTC CALCULATION (BEZET), ECG08: 455 MS
RBC # BLD AUTO: 4.96 M/UL (ref 3.8–5.2)
SAMPLES BEING HELD,HOLD: NORMAL
SODIUM SERPL-SCNC: 139 MMOL/L (ref 136–145)
VENTRICULAR RATE, ECG03: 90 BPM
WBC # BLD AUTO: 5.8 K/UL (ref 3.6–11)

## 2020-06-10 PROCEDURE — 36415 COLL VENOUS BLD VENIPUNCTURE: CPT

## 2020-06-10 PROCEDURE — 93005 ELECTROCARDIOGRAM TRACING: CPT

## 2020-06-10 PROCEDURE — 85025 COMPLETE CBC W/AUTO DIFF WBC: CPT

## 2020-06-10 PROCEDURE — 71046 X-RAY EXAM CHEST 2 VIEWS: CPT

## 2020-06-10 PROCEDURE — 99282 EMERGENCY DEPT VISIT SF MDM: CPT

## 2020-06-10 PROCEDURE — 80053 COMPREHEN METABOLIC PANEL: CPT

## 2020-06-10 RX ORDER — AZITHROMYCIN 250 MG/1
TABLET, FILM COATED ORAL
Qty: 6 TAB | Refills: 0 | Status: SHIPPED | OUTPATIENT
Start: 2020-06-10

## 2020-06-10 RX ORDER — NAPROXEN 500 MG/1
500 TABLET ORAL 2 TIMES DAILY WITH MEALS
Qty: 20 TAB | Refills: 0 | Status: SHIPPED | OUTPATIENT
Start: 2020-06-10

## 2020-06-10 NOTE — ED TRIAGE NOTES
Pt reports that she has been having SOB x 5 days. Reports mild cough and \"pain in lungs along back\". Pt is tachypneic. Denies fever. Denies known covid exposure.

## 2020-06-10 NOTE — ED PROVIDER NOTES
Cristhian De Santiago is a 31 y.o. female with past medical history notable for anemia presenting with cough, shortness of breath. States that she has had these symptoms for a week, associated with mild shortness of breath, pleuritic chest discomfort. Denies associated hemoptysis, measured fever although has had some subjective fever. No lower extremity edema. Has had a nonproductive cough. She was concerned the symptoms are related to  Vicks VapoRub that she used on her chest and in her nostrils, noted that the product  in 2016. Past Medical History:   Diagnosis Date    Anemia     Breast disorder        Past Surgical History:   Procedure Laterality Date    BREAST SURGERY PROCEDURE UNLISTED      HX BREAST LUMPECTOMY Right 2013         No family history on file.     Social History     Socioeconomic History    Marital status:      Spouse name: Not on file    Number of children: Not on file    Years of education: Not on file    Highest education level: Not on file   Occupational History    Not on file   Social Needs    Financial resource strain: Not on file    Food insecurity     Worry: Not on file     Inability: Not on file    Transportation needs     Medical: Not on file     Non-medical: Not on file   Tobacco Use    Smoking status: Never Smoker    Smokeless tobacco: Never Used   Substance and Sexual Activity    Alcohol use: Never     Frequency: Never    Drug use: Never    Sexual activity: Yes     Partners: Male   Lifestyle    Physical activity     Days per week: Not on file     Minutes per session: Not on file    Stress: Not on file   Relationships    Social connections     Talks on phone: Not on file     Gets together: Not on file     Attends Jewish service: Not on file     Active member of club or organization: Not on file     Attends meetings of clubs or organizations: Not on file     Relationship status: Not on file    Intimate partner violence Fear of current or ex partner: Not on file     Emotionally abused: Not on file     Physically abused: Not on file     Forced sexual activity: Not on file   Other Topics Concern     Service Not Asked    Blood Transfusions Not Asked    Caffeine Concern Not Asked    Occupational Exposure Not Asked    Hobby Hazards Not Asked    Sleep Concern Not Asked    Stress Concern Not Asked    Weight Concern Not Asked    Special Diet Not Asked    Back Care Not Asked    Exercise Not Asked    Bike Helmet Not Asked   [address] Not Asked    Self-Exams Not Asked   Social History Narrative    Not on file         ALLERGIES: Patient has no known allergies. Review of Systems   Constitutional: Negative for chills and fever. Eyes: Negative for photophobia. Respiratory: Positive for cough and shortness of breath. Cardiovascular: Negative for chest pain. Gastrointestinal: Negative for abdominal pain. Genitourinary: Negative for dysuria. Musculoskeletal: Negative for back pain. Neurological: Negative for headaches. Psychiatric/Behavioral: Negative for confusion. All other systems reviewed and are negative. Vitals:    06/10/20 1153   BP: 118/80   Pulse: 79   Resp: 22   Temp: 98.4 °F (36.9 °C)   SpO2: 97%   Weight: 69.9 kg (154 lb)   Height: 5' 1\" (1.549 m)            Physical Exam  Vitals signs reviewed. Constitutional:       General: She is not in acute distress. HENT:      Head: Normocephalic and atraumatic. Mouth/Throat:      Mouth: Mucous membranes are moist.      Pharynx: Oropharynx is clear. Cardiovascular:      Rate and Rhythm: Normal rate and regular rhythm. Heart sounds: Normal heart sounds. Pulmonary:      Effort: Pulmonary effort is normal. Tachypnea present. Breath sounds: Normal breath sounds. Abdominal:      Tenderness: There is no abdominal tenderness. There is no guarding or rebound. Musculoskeletal: Normal range of motion.    Skin:     General: Skin is warm and dry. Capillary Refill: Capillary refill takes less than 2 seconds. Neurological:      General: No focal deficit present. Mental Status: She is alert and oriented to person, place, and time. Psychiatric:         Mood and Affect: Mood normal.          MDM  Number of Diagnoses or Management Options  Suspected 2019 novel coronavirus infection:   Diagnosis management comments: EK:09 PM normal sinus rhythm, ventricular rate 90, normal axis, no ST elevation or depression. Normal EKG. Patient presenting with concern for possible toxicity of using  Vicks vapor rub, reassured her that this is unlikely, likely coincidental as this product was used to treat and already developing respiratory syndrome which is likely COVID-19 disease given pandemic that is ongoing and characteristic findings on x-ray, she however is hesitant to believe this however is agreeable to trialing a azithromycin given that this medication may be of some efficacy in phenotype of coronavirus disease with low inflammatory state. Emphasized that she should self isolated until the results of her tests are known, not go to work until results as well. All questions were answered in detail. All interactions were facilitated by Turkish .          Procedures

## 2020-06-10 NOTE — PROGRESS NOTES
6/10/2020  2:34 PM  Called into patient's room to conduct assessment and ACP discussion, no answer. Called cell phone listed on face sheet, which is actually her niece who usually translates for her. For this I would need to speak with the patient, so unable to complete at this time. Will attempt calling into room again later.      Mary Thomson BS

## 2020-06-11 ENCOUNTER — PATIENT OUTREACH (OUTPATIENT)
Dept: FAMILY MEDICINE CLINIC | Age: 30
End: 2020-06-11

## 2020-06-11 NOTE — PROGRESS NOTES
Patient contacted regarding recent discharge and COVID-19 risk. Discussed COVID-19 related testing which was not done at this time. Test results were not done. Patient informed of results, if available? no    Care Transition Nurse/ Ambulatory Care Manager contacted the patient by telephone to perform post discharge assessment. Verified name and  with patient as identifiers. Patient has following risk factors of: no known risk factors. CTN/ACM reviewed discharge instructions, medical action plan and red flags related to discharge diagnosis. Reviewed and educated them on any new and changed medications related to discharge diagnosis. Advised obtaining a 90-day supply of all daily and as-needed medications. Education provided regarding infection prevention, and signs and symptoms of COVID-19 and when to seek medical attention with patient who verbalized understanding. Discussed exposure protocols and quarantine from [address] you at higher risk for severe illness  and given an opportunity for questions and concerns. The patient agrees to contact the COVID-19 hotline [phone] or PCP office for questions related to their healthcare. CTN/ACM provided contact information for future reference. From CDC: Are you at higher risk for severe illness?  Wash your hands often.  Avoid close contact (6 feet, which is about two arm lengths) with people who are sick.  Put distance between yourself and other people if COVID-19 is spreading in your community.  Clean and disinfect frequently touched surfaces.  Avoid all cruise travel and non-essential air travel.  Call your healthcare professional if you have concerns about COVID-19 and your underlying condition or if you are sick.     For more information on steps you can take to protect yourself, see CDC's How to Protect Yourself      Patient/family/caregiver given information for [employer] and agrees to enroll no      Plan for follow-up call in 7-14 days based on severity of symptoms and risk factors.

## 2020-06-25 ENCOUNTER — PATIENT OUTREACH (OUTPATIENT)
Dept: FAMILY MEDICINE CLINIC | Age: 30
End: 2020-06-25

## 2020-06-25 NOTE — PROGRESS NOTES
Patient resolved from Transition of Care episode on 6/25/20  Discussed COVID-19 related testing which was not done at this time. Test results were not done. Patient informed of results, if available? no     Patient/family has been provided the following resources and education related to COVID-19:                         Signs, symptoms and red flags related to COVID-19            CDC exposure and quarantine guidelines            [facility] exposure contact - [phone]            Contact for their local Department of Health                 Patient currently reports that the following symptoms have improved:  no new symptoms and no worsening symptoms. No further outreach scheduled with this CTN/ACM/LPN/HC/ MA. Episode of Care resolved. Patient has this CTN/ACM/LPN/HC/MA contact information if future needs arise.

## 2021-03-05 ENCOUNTER — TELEPHONE (OUTPATIENT)
Dept: FAMILY MEDICINE CLINIC | Age: 31
End: 2021-03-05

## 2021-03-05 NOTE — TELEPHONE ENCOUNTER
----- Message from Sarah Rondon MD sent at 3/1/2021  1:14 PM EST -----  Regarding: Scheduling  Please schedule patient for VV to discuss birth control pills. Thank you!

## 2021-03-05 NOTE — TELEPHONE ENCOUNTER
[phone]    Spoke with patient with [facility] interpretor. Patient said she is going to another clinic and does not need any appt.

## 2025-04-09 NOTE — PROGRESS NOTES
Chief Complaint   Patient presents with    Routine Prenatal Visit     .  29w 4d today. No bleeding or LOF.  +FM.  Urinary Odor     1. Have you been to the ER, urgent care clinic since your last visit? Hospitalized since your last visit? No    2. Have you seen or consulted any other health care providers outside of the [address] [place] [address] since your last visit? Include any pap smears or colon screening.  No [Recent Weight Loss (___ Lbs)] : recent weight loss: [unfilled] lbs [Diarrhea] : diarrhea [Negative] : Heme/Lymph

## (undated) DEVICE — (D)PREP SKN CHLRAPRP APPL 26ML -- CONVERT TO ITEM 371833

## (undated) DEVICE — ROCKER SWITCH PENCIL HOLSTER: Brand: VALLEYLAB

## (undated) DEVICE — STERILE POLYISOPRENE POWDER-FREE SURGICAL GLOVES: Brand: PROTEXIS

## (undated) DEVICE — SUTURE STRATAFIX SYMMETRIC PDS + SZ 1 L18IN ABSRB VLT L48MM SXPP1A400

## (undated) DEVICE — C-SECTION II-LF: Brand: MEDLINE INDUSTRIES, INC.

## (undated) DEVICE — 3000CC GUARDIAN II: Brand: GUARDIAN

## (undated) DEVICE — TIP CLEANER: Brand: VALLEYLAB

## (undated) DEVICE — STRIP,CLOSURE,WOUND,MEDI-STRIP,1/2X4: Brand: MEDLINE

## (undated) DEVICE — REM POLYHESIVE ADULT PATIENT RETURN ELECTRODE: Brand: VALLEYLAB

## (undated) DEVICE — HANDLE LT SNAP ON ULT DURABLE LENS FOR TRUMPF ALC DISPOSABLE

## (undated) DEVICE — BULB SYRINGE, IRRIGATION WITH PROTECTIVE CAP, 60 CC, INDIVIDUALLY WRAPPED: Brand: DOVER

## (undated) DEVICE — SOLUTION IV 1000ML 0.9% SOD CHL

## (undated) DEVICE — TOWEL,OR,DSP,ST,BLUE,STD,2/PK,40PK/CS: Brand: MEDLINE

## (undated) DEVICE — CATH FOLEY 16F LUBRI-SIL IC --

## (undated) DEVICE — LARGE, DISPOSABLE ALEXIS O C-SECTION PROTECTOR - RETRACTOR: Brand: ALEXIS ® O C-SECTION PROTECTOR - RETRACTOR

## (undated) DEVICE — STAPLER SKIN SQ 30 ABSRB STPL DISP INSORB

## (undated) DEVICE — GOWN,AURORA,FABRIC-REINFORCED,X-LARGE: Brand: MEDLINE

## (undated) DEVICE — SUTURE MCRYL SZ 0 L36IN ABSRB UD L36MM CT-1 1/2 CIR Y946H

## (undated) DEVICE — STERILE POLYISOPRENE POWDER-FREE SURGICAL GLOVES WITH EMOLLIENT COATING: Brand: PROTEXIS

## (undated) DEVICE — KENDALL SCD EXPRESS SLEEVES, KNEE LENGTH, MEDIUM: Brand: KENDALL SCD

## (undated) DEVICE — 3M™ MEDIPORE™ H SOFT CLOTH SURGICAL TAPE, 2863, 3 IN X 10 YD, 12/CASE: Brand: 3M™ MEDIPORE™

## (undated) DEVICE — STERILE LATEX POWDER-FREE SURGICAL GLOVESWITH NITRILE COATING: Brand: PROTEXIS

## (undated) DEVICE — MASTISOL ADHESIVE LIQ 2/3ML

## (undated) DEVICE — PAD,ABDOMINAL,5"X9",ST,LF,25/BX: Brand: MEDLINE INDUSTRIES, INC.